# Patient Record
Sex: MALE | Race: WHITE
[De-identification: names, ages, dates, MRNs, and addresses within clinical notes are randomized per-mention and may not be internally consistent; named-entity substitution may affect disease eponyms.]

---

## 2017-07-20 ENCOUNTER — APPOINTMENT (OUTPATIENT)
Dept: INTERNAL MEDICINE | Facility: CLINIC | Age: 72
End: 2017-07-20

## 2017-07-20 ENCOUNTER — NON-APPOINTMENT (OUTPATIENT)
Age: 72
End: 2017-07-20

## 2017-07-20 VITALS
HEIGHT: 73 IN | SYSTOLIC BLOOD PRESSURE: 110 MMHG | WEIGHT: 238 LBS | HEART RATE: 66 BPM | TEMPERATURE: 98.4 F | OXYGEN SATURATION: 98 % | BODY MASS INDEX: 31.54 KG/M2 | DIASTOLIC BLOOD PRESSURE: 80 MMHG

## 2017-07-20 VITALS — DIASTOLIC BLOOD PRESSURE: 82 MMHG | SYSTOLIC BLOOD PRESSURE: 120 MMHG

## 2017-07-20 DIAGNOSIS — M54.5 LOW BACK PAIN: ICD-10-CM

## 2017-07-20 DIAGNOSIS — Z86.010 PERSONAL HISTORY OF COLONIC POLYPS: ICD-10-CM

## 2017-07-20 LAB
BILIRUB UR QL STRIP: NEGATIVE
CLARITY UR: CLEAR
COLLECTION METHOD: NORMAL
GLUCOSE UR-MCNC: NEGATIVE
HCG UR QL: 0.2 EU/DL
HGB UR QL STRIP.AUTO: NEGATIVE
KETONES UR-MCNC: NEGATIVE
LEUKOCYTE ESTERASE UR QL STRIP: NEGATIVE
NITRITE UR QL STRIP: NEGATIVE
PH UR STRIP: 5
PROT UR STRIP-MCNC: NEGATIVE
SP GR UR STRIP: 1.02

## 2017-07-20 RX ORDER — PAZOPANIB HYDROCHLORIDE 200 MG/1
200 TABLET, FILM COATED ORAL DAILY
Refills: 0 | Status: ACTIVE | COMMUNITY
Start: 2017-07-20

## 2018-07-26 ENCOUNTER — TRANSCRIPTION ENCOUNTER (OUTPATIENT)
Age: 73
End: 2018-07-26

## 2018-07-26 ENCOUNTER — APPOINTMENT (OUTPATIENT)
Dept: INTERNAL MEDICINE | Facility: CLINIC | Age: 73
End: 2018-07-26
Payer: COMMERCIAL

## 2018-07-26 ENCOUNTER — NON-APPOINTMENT (OUTPATIENT)
Age: 73
End: 2018-07-26

## 2018-07-26 VITALS
WEIGHT: 231 LBS | SYSTOLIC BLOOD PRESSURE: 110 MMHG | BODY MASS INDEX: 30.62 KG/M2 | DIASTOLIC BLOOD PRESSURE: 82 MMHG | HEART RATE: 67 BPM | HEIGHT: 73 IN | TEMPERATURE: 98.7 F | OXYGEN SATURATION: 98 %

## 2018-07-26 VITALS — SYSTOLIC BLOOD PRESSURE: 126 MMHG | DIASTOLIC BLOOD PRESSURE: 84 MMHG

## 2018-07-26 DIAGNOSIS — Z82.49 FAMILY HISTORY OF ISCHEMIC HEART DISEASE AND OTHER DISEASES OF THE CIRCULATORY SYSTEM: ICD-10-CM

## 2018-07-26 DIAGNOSIS — L25.3 UNSPECIFIED CONTACT DERMATITIS DUE TO OTHER CHEMICAL PRODUCTS: ICD-10-CM

## 2018-07-26 DIAGNOSIS — Z83.49 FAMILY HISTORY OF OTHER ENDOCRINE, NUTRITIONAL AND METABOLIC DISEASES: ICD-10-CM

## 2018-07-26 DIAGNOSIS — Z80.1 FAMILY HISTORY OF MALIGNANT NEOPLASM OF TRACHEA, BRONCHUS AND LUNG: ICD-10-CM

## 2018-07-26 DIAGNOSIS — Z80.8 FAMILY HISTORY OF MALIGNANT NEOPLASM OF OTHER ORGANS OR SYSTEMS: ICD-10-CM

## 2018-07-26 PROCEDURE — 93000 ELECTROCARDIOGRAM COMPLETE: CPT

## 2018-07-26 PROCEDURE — 82270 OCCULT BLOOD FECES: CPT

## 2018-07-26 PROCEDURE — 99397 PER PM REEVAL EST PAT 65+ YR: CPT | Mod: 25

## 2018-07-26 NOTE — ASSESSMENT
[FreeTextEntry1] : 73-year-old obese male presented for annual physical exam. The patient has a history of renal cell cancer status post nephrectomy many years ago, he was found to have lung metastases a few years ago, he is now under the care of her oncologist and is considered to be stable on medication.\par \par The patient feels well without any new complaint. He is not able to lose weight, we discussed his goal weight exercise and diet. His cholesterol panel showed an elevated triglyceride, he also has minimally elevated fasting glucose. We discussed the importance of a healthy lifestyle. The patient was advised to stay on a low carbohydrate diet as well.\par \par His blood tests also showed mild vitamin D insufficiency, he was recommended to start vitamin D 1000 international units daily.\par \par The patient is up-to-date with his health care maintenance tests. He was reminded to have routine eye exam, dental care and skin exam with the dermatologist.

## 2018-07-26 NOTE — PHYSICAL EXAM
[No Acute Distress] : no acute distress [Well Nourished] : well nourished [Well Developed] : well developed [Well-Appearing] : well-appearing [Normal Sclera/Conjunctiva] : normal sclera/conjunctiva [PERRL] : pupils equal round and reactive to light [EOMI] : extraocular movements intact [Normal Oropharynx] : the oropharynx was normal [Normal TMs] : both tympanic membranes were normal [Normal Nasal Mucosa] : the nasal mucosa was normal [No JVD] : no jugular venous distention [Supple] : supple [No Lymphadenopathy] : no lymphadenopathy [No Respiratory Distress] : no respiratory distress  [Clear to Auscultation] : lungs were clear to auscultation bilaterally [Normal Rate] : normal rate  [Regular Rhythm] : with a regular rhythm [Normal S1, S2] : normal S1 and S2 [No Carotid Bruits] : no carotid bruits [Pedal Pulses Present] : the pedal pulses are present [No Edema] : there was no peripheral edema [No Extremity Clubbing/Cyanosis] : no extremity clubbing/cyanosis [No Masses] : no palpable masses [No Nipple Discharge] : no nipple discharge [No Axillary Lymphadenopathy] : no axillary lymphadenopathy [Soft] : abdomen soft [Non Tender] : non-tender [Non-distended] : non-distended [Normal Bowel Sounds] : normal bowel sounds [Normal Sphincter Tone] : normal sphincter tone [No Mass] : no mass [Stool Occult Blood] : no occult stool [Prostate Enlargement] : the prostate was not enlarged [Prostate Tenderness] : the prostate was not tender [Normal Posterior Cervical Nodes] : no posterior cervical lymphadenopathy [Normal Anterior Cervical Nodes] : no anterior cervical lymphadenopathy [No CVA Tenderness] : no CVA  tenderness [No Spinal Tenderness] : no spinal tenderness [No Joint Swelling] : no joint swelling [Grossly Normal Strength/Tone] : grossly normal strength/tone [No Rash] : no rash [No Skin Lesions] : no skin lesions [Normal Gait] : normal gait [Coordination Grossly Intact] : coordination grossly intact [No Focal Deficits] : no focal deficits [Speech Grossly Normal] : speech grossly normal [Normal Affect] : the affect was normal [Alert and Oriented x3] : oriented to person, place, and time [Normal Mood] : the mood was normal [de-identified] : obese male in stated age,

## 2018-07-26 NOTE — COUNSELING
[Weight management counseling provided] : Weight management [Healthy eating counseling provided] : healthy eating [Activity counseling provided] : activity [Target Wt Loss Goal ___] : Target weight loss goal [unfilled] lbs [Low Fat Diet] : Low fat diet [Decrease Portions] : Decrease food portions [___ min/wk activity recommended] : [unfilled] min/wk activity recommended [None] : None [Good understanding] : Patient has a good understanding of lifestyle changes and the steps needed to achieve self management goals

## 2018-07-26 NOTE — REVIEW OF SYSTEMS
[Recent Change In Weight] : ~T recent weight change [Skin Rash] : skin rash [Easy Bruising] : easy bruising [Negative] : Heme/Lymph [Fever] : no fever [Chills] : no chills [Fatigue] : no fatigue [Chest Pain] : no chest pain [Palpitations] : no palpitations [Shortness Of Breath] : no shortness of breath [Wheezing] : no wheezing [Cough] : no cough [Dyspnea on Exertion] : not dyspnea on exertion [Abdominal Pain] : no abdominal pain [Nausea] : no nausea [Constipation] : no constipation [Diarrhea] : no diarrhea [Vomiting] : no vomiting [Heartburn] : no heartburn [Melena] : no melena [Dysuria] : no dysuria [Incontinence] : no incontinence [Nocturia] : no nocturia [Hematuria] : no hematuria [Joint Pain] : no joint pain [Joint Stiffness] : no joint stiffness [Muscle Pain] : no muscle pain [Back Pain] : no back pain [Joint Swelling] : no joint swelling [Itching] : no itching [Mole Changes] : no mole changes [Insomnia] : no insomnia [Anxiety] : no anxiety [Depression] : no depression [Easy Bleeding] : no easy bleeding [Swollen Glands] : no swollen glands [FreeTextEntry2] : Intentional weight loss of about 7-8 pounds in the past year. [FreeTextEntry3] : wears reading glasses, [de-identified] : Rash from Votrient, has topical steroid, use PRN only. [de-identified] : Easy bruisability on Votrient,

## 2018-07-26 NOTE — HISTORY OF PRESENT ILLNESS
[de-identified] : Pt presented for PE.\par \par He is still going to [facility] for recurrent renal cell CA in lung since 2016 every 8 weeks, and getting CT scan every 4 months.  Last CT scan is stable.   He is still on Votrient 200 mg 1 tab daily.\par \par The original renal cell CA was treated with L nephrectomy in 2005.\par \par In the past year, his health was uneventful, and he has no new complaint.  He is still working. He also exercises 3 x per week.\par \par Pt had Flu vaccine with the Oncologist in October.

## 2018-07-26 NOTE — DATA REVIEWED
[FreeTextEntry1] : Derm - 6/2017\par stress echo - 7/5/2011\par \par EKG - NSR, R - 66, axis - (-)15, no interval change. \par \par Labs 7/19/2018 reviewed - WBC, 4.6, H/H - 15.1/45.3, MCV - 100, PLT - 141(L), normal diff,\par                                              Glucose - 106,\par                                              Chol - 192, HDL - 44, LDL - 93, TG - 274,\par                                              Vitamin D - 27.9\par                                              the rest of labs were unremarkable.

## 2018-07-26 NOTE — HEALTH RISK ASSESSMENT
[Very Good] : ~his/her~  mood as very good [No falls in past year] : Patient reported no falls in the past year [0] : 2) Feeling down, depressed, or hopeless: Not at all (0) [HIV test declined] : HIV test declined [Hepatitis C test declined] : Hepatitis C test declined [None] : None [With Family] : lives with family [# of Members in Household ___] :  household currently consist of [unfilled] member(s) [Employed] : employed [Graduate School] : graduate school [] :  [# Of Children ___] : has [unfilled] children [Feels Safe at Home] : Feels safe at home [Fully functional (bathing, dressing, toileting, transferring, walking, feeding)] : Fully functional (bathing, dressing, toileting, transferring, walking, feeding) [Fully functional (using the telephone, shopping, preparing meals, housekeeping, doing laundry, using] : Fully functional and needs no help or supervision to perform IADLs (using the telephone, shopping, preparing meals, housekeeping, doing laundry, using transportation, managing medications and managing finances) [Smoke Detector] : smoke detector [Carbon Monoxide Detector] : carbon monoxide detector [Seat Belt] :  uses seat belt [Discussed at today's visit] : Advance Directives Discussed at today's visit [Relationship: ___] : Relationship: [unfilled] [] : No [de-identified] : Social [Change in mental status noted] : No change in mental status noted [Reports changes in hearing] : Reports no changes in hearing [Reports changes in vision] : Reports no changes in vision [Reports changes in dental health] : Reports no changes in dental health [ColonoscopyDate] : 07/16 [de-identified] : eye exam - 8/2017 [de-identified] : dentist - 5/2018

## 2018-07-31 ENCOUNTER — INBOUND DOCUMENT (OUTPATIENT)
Age: 73
End: 2018-07-31

## 2018-10-02 ENCOUNTER — INBOUND DOCUMENT (OUTPATIENT)
Age: 73
End: 2018-10-02

## 2018-11-02 ENCOUNTER — APPOINTMENT (OUTPATIENT)
Dept: INTERNAL MEDICINE | Facility: CLINIC | Age: 73
End: 2018-11-02
Payer: COMMERCIAL

## 2018-11-02 VITALS
BODY MASS INDEX: 30.88 KG/M2 | WEIGHT: 233 LBS | DIASTOLIC BLOOD PRESSURE: 86 MMHG | HEIGHT: 73 IN | TEMPERATURE: 98.4 F | SYSTOLIC BLOOD PRESSURE: 120 MMHG

## 2018-11-02 DIAGNOSIS — Z23 ENCOUNTER FOR IMMUNIZATION: ICD-10-CM

## 2018-11-02 PROCEDURE — 90662 IIV NO PRSV INCREASED AG IM: CPT

## 2018-11-02 PROCEDURE — 99212 OFFICE O/P EST SF 10 MIN: CPT | Mod: 25

## 2018-11-02 PROCEDURE — G0008: CPT

## 2018-11-02 NOTE — PHYSICAL EXAM
[No Acute Distress] : no acute distress [Well Nourished] : well nourished [Well Developed] : well developed [No Respiratory Distress] : no respiratory distress  [Clear to Auscultation] : lungs were clear to auscultation bilaterally [Normal Rate] : normal rate  [Regular Rhythm] : with a regular rhythm [Normal S1, S2] : normal S1 and S2 [No Edema] : there was no peripheral edema [No Extremity Clubbing/Cyanosis] : no extremity clubbing/cyanosis [Soft] : abdomen soft [Non Tender] : non-tender [Normal Bowel Sounds] : normal bowel sounds [No Joint Swelling] : no joint swelling [Grossly Normal Strength/Tone] : grossly normal strength/tone [de-identified] : Obese male,

## 2018-11-02 NOTE — HISTORY OF PRESENT ILLNESS
[de-identified] : Pt presented for Flu vaccine.  He feels well without any new complaint.\par \par He is continued on Votrient for kidney cancer with lung mets.  He is doing well without side effect, and he is considered stable by oncologist, who he follows up regularly,

## 2018-11-02 NOTE — ASSESSMENT
[FreeTextEntry1] : 73-year-old patient with kidney cancer and lung metastases presented for flu vaccine. \par High dose Fluzone vaccine 0.5 mL was administered IM 2 left deltoid. \par Patient is stable on Votrient and tolerates medication well.  He follows up with oncologist regularly.

## 2018-12-17 ENCOUNTER — INBOUND DOCUMENT (OUTPATIENT)
Age: 73
End: 2018-12-17

## 2019-02-12 ENCOUNTER — INBOUND DOCUMENT (OUTPATIENT)
Age: 74
End: 2019-02-12

## 2019-04-23 ENCOUNTER — INBOUND DOCUMENT (OUTPATIENT)
Age: 74
End: 2019-04-23

## 2019-05-10 ENCOUNTER — RX RENEWAL (OUTPATIENT)
Age: 74
End: 2019-05-10

## 2019-08-07 ENCOUNTER — NON-APPOINTMENT (OUTPATIENT)
Age: 74
End: 2019-08-07

## 2019-08-07 ENCOUNTER — APPOINTMENT (OUTPATIENT)
Dept: INTERNAL MEDICINE | Facility: CLINIC | Age: 74
End: 2019-08-07
Payer: COMMERCIAL

## 2019-08-07 VITALS
BODY MASS INDEX: 29.55 KG/M2 | SYSTOLIC BLOOD PRESSURE: 110 MMHG | HEIGHT: 73 IN | WEIGHT: 223 LBS | HEART RATE: 72 BPM | TEMPERATURE: 98.4 F | DIASTOLIC BLOOD PRESSURE: 70 MMHG | OXYGEN SATURATION: 92 %

## 2019-08-07 PROCEDURE — 93000 ELECTROCARDIOGRAM COMPLETE: CPT

## 2019-08-07 PROCEDURE — 82270 OCCULT BLOOD FECES: CPT

## 2019-08-07 PROCEDURE — 99397 PER PM REEVAL EST PAT 65+ YR: CPT | Mod: 25

## 2019-08-07 NOTE — DATA REVIEWED
[FreeTextEntry1] : Derm - 10/2019, \par \par EKG - NSR< R - 67, axis - (-)15, no interval change. \par \par labs 8/3/2019 reviewed -\par * glucose - 81, HbA1c - 5.4\par * Chol - 187, HDL - 46, LDL - 111, TG - 151,\par * Vitamin D - 36.7,\par * the rest of labs were unremarkable.

## 2019-08-07 NOTE — COUNSELING
[Potential consequences of obesity discussed] : Potential consequences of obesity discussed [Benefits of weight loss discussed] : Benefits of weight loss discussed [Encouraged to increase physical activity] : Encouraged to increase physical activity [Target Wt Loss Goal ___] : Weight Loss Goals: Target weight loss goal [unfilled] lbs [____ min/wk Activity] : [unfilled] min/wk activity [Decrease Portions] : decrease portions [Good understanding] : Patient has a good understanding of disease, goals and obesity follow-up plan

## 2019-08-07 NOTE — PHYSICAL EXAM
[Well Nourished] : well nourished [No Acute Distress] : no acute distress [Well Developed] : well developed [Normal Sclera/Conjunctiva] : normal sclera/conjunctiva [PERRL] : pupils equal round and reactive to light [EOMI] : extraocular movements intact [Normal Outer Ear/Nose] : the outer ears and nose were normal in appearance [Normal Oropharynx] : the oropharynx was normal [Normal TMs] : both tympanic membranes were normal [Normal Nasal Mucosa] : the nasal mucosa was normal [No JVD] : no jugular venous distention [No Lymphadenopathy] : no lymphadenopathy [Supple] : supple [No Respiratory Distress] : no respiratory distress  [Normal Rate] : normal rate  [Clear to Auscultation] : lungs were clear to auscultation bilaterally [Regular Rhythm] : with a regular rhythm [Normal S1, S2] : normal S1 and S2 [No Carotid Bruits] : no carotid bruits [Pedal Pulses Present] : the pedal pulses are present [No Edema] : there was no peripheral edema [Normal Appearance] : normal in appearance [No Extremity Clubbing/Cyanosis] : no extremity clubbing/cyanosis [No Masses] : no palpable masses [No Nipple Discharge] : no nipple discharge [No Axillary Lymphadenopathy] : no axillary lymphadenopathy [Soft] : abdomen soft [Non Tender] : non-tender [Non-distended] : non-distended [Normal Bowel Sounds] : normal bowel sounds [Normal Sphincter Tone] : normal sphincter tone [No Mass] : no mass [Prostate Enlargement] : the prostate was not enlarged [Prostate Tenderness] : the prostate was not tender [No Prostate Nodules] : no prostate nodules [Normal Supraclavicular Nodes] : no supraclavicular lymphadenopathy [Normal Axillary Nodes] : no axillary lymphadenopathy [Normal Posterior Cervical Nodes] : no posterior cervical lymphadenopathy [Normal Anterior Cervical Nodes] : no anterior cervical lymphadenopathy [No CVA Tenderness] : no CVA  tenderness [No Spinal Tenderness] : no spinal tenderness [No Joint Swelling] : no joint swelling [Grossly Normal Strength/Tone] : grossly normal strength/tone [No Rash] : no rash [Coordination Grossly Intact] : coordination grossly intact [No Focal Deficits] : no focal deficits [Normal Gait] : normal gait [Speech Grossly Normal] : speech grossly normal [Normal Affect] : the affect was normal [Alert and Oriented x3] : oriented to person, place, and time [Normal Mood] : the mood was normal [Stool Occult Blood] : stool negative for occult blood [de-identified] : overweight male in stated age,

## 2019-08-07 NOTE — ASSESSMENT
[FreeTextEntry1] : Pt is due for repeat colonoscopy, he will f/u with GI.  He was reminded to have routine eye exam, dental care and skin exam with dermatologist.

## 2019-08-07 NOTE — HEALTH RISK ASSESSMENT
[Excellent] : ~his/her~ current health as excellent [Yes] : Yes [2 - 4 times a month (2 pts)] : 2-4 times a month (2 points) [1 or 2 (0 pts)] : 1 or 2 (0 points) [No] : In the past 12 months have you used drugs other than those required for medical reasons? No [Never (0 pts)] : Never (0 points) [No falls in past year] : Patient reported no falls in the past year [0] : 2) Feeling down, depressed, or hopeless: Not at all (0) [HIV Test offered] : HIV Test offered [Hepatitis C test offered] : Hepatitis C test offered [With Family] : lives with family [None] : None [# of Members in Household ___] :  household currently consist of [unfilled] member(s) [Employed] : employed [Graduate School] : graduate school [] :  [# Of Children ___] : has [unfilled] children [Feels Safe at Home] : Feels safe at home [Fully functional (bathing, dressing, toileting, transferring, walking, feeding)] : Fully functional (bathing, dressing, toileting, transferring, walking, feeding) [Fully functional (using the telephone, shopping, preparing meals, housekeeping, doing laundry, using] : Fully functional and needs no help or supervision to perform IADLs (using the telephone, shopping, preparing meals, housekeeping, doing laundry, using transportation, managing medications and managing finances) [Carbon Monoxide Detector] : carbon monoxide detector [Smoke Detector] : smoke detector [Seat Belt] :  uses seat belt [With Patient/Caregiver] : With Patient/Caregiver [Relationship: ___] : Relationship: [unfilled] [] : No [de-identified] : goes to the gym 3 days a week, walks a lot. [EyeExamDate] : 09/18 [Change in mental status noted] : No change in mental status noted [Reports changes in hearing] : Reports no changes in hearing [Reports changes in vision] : Reports no changes in vision [Reports changes in dental health] : Reports no changes in dental health [de-identified] : dentist - 06/2019,every 3-4 months [ColonoscopyDate] : 07/16 [AdvancecareDate] : 08/2019

## 2019-08-07 NOTE — HISTORY OF PRESENT ILLNESS
[de-identified] : Pt presented for PE.  Last PE was 7/2018.  \par \par Pt still goes to [facility] every 8 weeks, and gets labs every 8 weeks.  He gets a CT scan every 4 months, and is still on Votrient.  The lung lesion is stable.

## 2019-08-07 NOTE — REVIEW OF SYSTEMS
[Recent Change In Weight] : ~T recent weight change [Diarrhea] : diarrhea [Negative] : Psychiatric [Fever] : no fever [Chills] : no chills [Chest Pain] : no chest pain [Fatigue] : no fatigue [Palpitations] : no palpitations [Lower Ext Edema] : no lower extremity edema [Shortness Of Breath] : no shortness of breath [Wheezing] : no wheezing [Cough] : no cough [Abdominal Pain] : no abdominal pain [Dyspnea on Exertion] : not dyspnea on exertion [Nausea] : no nausea [Constipation] : no constipation [Vomiting] : no vomiting [Heartburn] : no heartburn [Dysuria] : no dysuria [Melena] : no melena [Nocturia] : no nocturia [Hematuria] : no hematuria [Joint Pain] : no joint pain [Joint Stiffness] : no joint stiffness [Back Pain] : no back pain [Joint Swelling] : no joint swelling [Mole Changes] : no mole changes [Itching] : no itching [Skin Rash] : no skin rash [Dizziness] : no dizziness [Headache] : no headache [Fainting] : no fainting [Insomnia] : no insomnia [Unsteady Walk] : no ataxia [Anxiety] : no anxiety [Depression] : no depression [Easy Bleeding] : no easy bleeding [Easy Bruising] : no easy bruising [Swollen Glands] : no swollen glands [FreeTextEntry2] : 10 pounds intentional weight loss in the last 9 months, [FreeTextEntry7] : 1-2 bout loose stool every week from Votrient,

## 2019-09-26 ENCOUNTER — APPOINTMENT (OUTPATIENT)
Dept: GASTROENTEROLOGY | Facility: CLINIC | Age: 74
End: 2019-09-26
Payer: COMMERCIAL

## 2019-09-26 VITALS
TEMPERATURE: 98.6 F | BODY MASS INDEX: 30.22 KG/M2 | OXYGEN SATURATION: 97 % | SYSTOLIC BLOOD PRESSURE: 110 MMHG | WEIGHT: 228 LBS | DIASTOLIC BLOOD PRESSURE: 80 MMHG | HEART RATE: 74 BPM | HEIGHT: 73 IN

## 2019-09-26 DIAGNOSIS — Z12.11 ENCOUNTER FOR SCREENING FOR MALIGNANT NEOPLASM OF COLON: ICD-10-CM

## 2019-09-26 PROCEDURE — 99203 OFFICE O/P NEW LOW 30 MIN: CPT

## 2019-09-26 NOTE — HISTORY OF PRESENT ILLNESS
[FreeTextEntry1] : Patient came to the office today to arrange for colonoscopy. His been about 3 if he is at his last examination. Patient has a history of adenomatous polyps. Patient's past medical and surgical history are reviewed. He is under treatment at [facility] for metastatic renal cell cancer noted on chest CT. Patient overall feels well and denies current complaint of nausea vomiting fever chills rectal bleeding or melena.

## 2019-09-26 NOTE — PHYSICAL EXAM
[General Appearance - Alert] : alert [General Appearance - In No Acute Distress] : in no acute distress [PERRL With Normal Accommodation] : pupils were equal in size, round, and reactive to light [Sclera] : the sclera and conjunctiva were normal [Extraocular Movements] : extraocular movements were intact [Outer Ear] : the ears and nose were normal in appearance [Oropharynx] : the oropharynx was normal [Neck Appearance] : the appearance of the neck was normal [Neck Cervical Mass (___cm)] : no neck mass was observed [Jugular Venous Distention Increased] : there was no jugular-venous distention [Thyroid Diffuse Enlargement] : the thyroid was not enlarged [Thyroid Nodule] : there were no palpable thyroid nodules [Auscultation Breath Sounds / Voice Sounds] : lungs were clear to auscultation bilaterally [Heart Sounds] : normal S1 and S2 [Heart Rate And Rhythm] : heart rate was normal and rhythm regular [Heart Sounds Gallop] : no gallops [Murmurs] : no murmurs [Heart Sounds Pericardial Friction Rub] : no pericardial rub [Edema] : there was no peripheral edema [Bowel Sounds] : normal bowel sounds [Veins - Varicosity Changes] : there were no varicosital changes [Abdomen Soft] : soft [Abdomen Tenderness] : non-tender [FreeTextEntry1] : Surgical scar left [Abdomen Mass (___ Cm)] : no abdominal mass palpated [Cervical Lymph Nodes Enlarged Posterior Bilaterally] : posterior cervical [Cervical Lymph Nodes Enlarged Anterior Bilaterally] : anterior cervical [Supraclavicular Lymph Nodes Enlarged Bilaterally] : supraclavicular [No CVA Tenderness] : no ~M costovertebral angle tenderness [No Spinal Tenderness] : no spinal tenderness [Abnormal Walk] : normal gait [Nail Clubbing] : no clubbing  or cyanosis of the fingernails [Musculoskeletal - Swelling] : no joint swelling seen [Motor Tone] : muscle strength and tone were normal [Skin Color & Pigmentation] : normal skin color and pigmentation [Skin Turgor] : normal skin turgor [] : no rash [Sensation] : the sensory exam was normal to light touch and pinprick [Oriented To Time, Place, And Person] : oriented to person, place, and time [Affect] : the affect was normal [Impaired Insight] : insight and judgment were intact

## 2019-09-26 NOTE — ASSESSMENT
[FreeTextEntry1] : Impression and plan\par \par Patient presents to the office today to arrange for colonoscopy. The risks benefits alternatives and limitations were discussed and make further suggestions after above testing is completed.

## 2019-10-10 ENCOUNTER — APPOINTMENT (OUTPATIENT)
Dept: GASTROENTEROLOGY | Facility: AMBULATORY MEDICAL SERVICES | Age: 74
End: 2019-10-10
Payer: COMMERCIAL

## 2019-10-10 PROCEDURE — 45378 DIAGNOSTIC COLONOSCOPY: CPT

## 2020-08-04 LAB
APPEARANCE: CLEAR
BASOPHILS # BLD AUTO: 0.02 K/UL
BASOPHILS NFR BLD AUTO: 0.4 %
BILIRUBIN URINE: NEGATIVE
BLOOD URINE: NEGATIVE
COLOR: YELLOW
EOSINOPHIL # BLD AUTO: 0.05 K/UL
EOSINOPHIL NFR BLD AUTO: 1 %
GLUCOSE QUALITATIVE U: NEGATIVE
HCT VFR BLD CALC: 48.7 %
HGB BLD-MCNC: 15.8 G/DL
IMM GRANULOCYTES NFR BLD AUTO: 0.4 %
KETONES URINE: NEGATIVE
LEUKOCYTE ESTERASE URINE: NEGATIVE
LYMPHOCYTES # BLD AUTO: 1.27 K/UL
LYMPHOCYTES NFR BLD AUTO: 24.8 %
MAN DIFF?: NORMAL
MCHC RBC-ENTMCNC: 32.4 GM/DL
MCHC RBC-ENTMCNC: 32.8 PG
MCV RBC AUTO: 101.2 FL
MONOCYTES # BLD AUTO: 0.57 K/UL
MONOCYTES NFR BLD AUTO: 11.1 %
NEUTROPHILS # BLD AUTO: 3.19 K/UL
NEUTROPHILS NFR BLD AUTO: 62.3 %
NITRITE URINE: NEGATIVE
PH URINE: 6
PLATELET # BLD AUTO: 209 K/UL
PROTEIN URINE: NORMAL
PSA SERPL-MCNC: 4.4 NG/ML
RBC # BLD: 4.81 M/UL
RBC # FLD: 13.8 %
SPECIFIC GRAVITY URINE: 1.02
UROBILINOGEN URINE: NORMAL
WBC # FLD AUTO: 5.12 K/UL

## 2020-08-05 LAB
25(OH)D3 SERPL-MCNC: 50.3 NG/ML
ALBUMIN SERPL ELPH-MCNC: 4.5 G/DL
ALP BLD-CCNC: 68 U/L
ALT SERPL-CCNC: 20 U/L
ANION GAP SERPL CALC-SCNC: 19 MMOL/L
AST SERPL-CCNC: 31 U/L
BILIRUB SERPL-MCNC: 0.5 MG/DL
BUN SERPL-MCNC: 21 MG/DL
CALCIUM SERPL-MCNC: 9.4 MG/DL
CHLORIDE SERPL-SCNC: 101 MMOL/L
CHOLEST SERPL-MCNC: 221 MG/DL
CHOLEST/HDLC SERPL: 4.8 RATIO
CO2 SERPL-SCNC: 21 MMOL/L
CREAT SERPL-MCNC: 1.39 MG/DL
FOLATE SERPL-MCNC: 14.5 NG/ML
GLUCOSE SERPL-MCNC: 93 MG/DL
HDLC SERPL-MCNC: 46 MG/DL
LDLC SERPL CALC-MCNC: 128 MG/DL
POTASSIUM SERPL-SCNC: 5.2 MMOL/L
PROT SERPL-MCNC: 7 G/DL
SODIUM SERPL-SCNC: 141 MMOL/L
T4 FREE SERPL-MCNC: 1.6 NG/DL
TRIGL SERPL-MCNC: 235 MG/DL
TSH SERPL-ACNC: 4.81 UIU/ML
VIT B12 SERPL-MCNC: 536 PG/ML

## 2020-08-10 ENCOUNTER — NON-APPOINTMENT (OUTPATIENT)
Age: 75
End: 2020-08-10

## 2020-08-10 ENCOUNTER — APPOINTMENT (OUTPATIENT)
Dept: INTERNAL MEDICINE | Facility: CLINIC | Age: 75
End: 2020-08-10
Payer: COMMERCIAL

## 2020-08-10 VITALS
WEIGHT: 222 LBS | DIASTOLIC BLOOD PRESSURE: 89 MMHG | SYSTOLIC BLOOD PRESSURE: 130 MMHG | HEART RATE: 67 BPM | HEIGHT: 73 IN | OXYGEN SATURATION: 98 % | TEMPERATURE: 98.7 F | BODY MASS INDEX: 29.42 KG/M2

## 2020-08-10 VITALS
DIASTOLIC BLOOD PRESSURE: 78 MMHG | WEIGHT: 225 LBS | HEIGHT: 73 IN | SYSTOLIC BLOOD PRESSURE: 132 MMHG | BODY MASS INDEX: 29.82 KG/M2

## 2020-08-10 DIAGNOSIS — D75.89 OTHER SPECIFIED DISEASES OF BLOOD AND BLOOD-FORMING ORGANS: ICD-10-CM

## 2020-08-10 DIAGNOSIS — Z11.59 ENCOUNTER FOR SCREENING FOR OTHER VIRAL DISEASES: ICD-10-CM

## 2020-08-10 PROCEDURE — 82270 OCCULT BLOOD FECES: CPT

## 2020-08-10 PROCEDURE — 93000 ELECTROCARDIOGRAM COMPLETE: CPT

## 2020-08-10 PROCEDURE — 99397 PER PM REEVAL EST PAT 65+ YR: CPT | Mod: 25

## 2020-08-10 RX ORDER — MULTIVIT-MIN/FOLIC/VIT K/LYCOP 400-300MCG
250 TABLET ORAL DAILY
Refills: 0 | Status: COMPLETED | COMMUNITY
Start: 2019-08-07 | End: 2020-08-10

## 2020-08-10 RX ORDER — SODIUM SULFATE, POTASSIUM SULFATE, MAGNESIUM SULFATE 17.5; 3.13; 1.6 G/ML; G/ML; G/ML
17.5-3.13-1.6 SOLUTION, CONCENTRATE ORAL
Qty: 1 | Refills: 0 | Status: COMPLETED | COMMUNITY
Start: 2019-09-26 | End: 2020-08-10

## 2020-08-10 RX ORDER — MUPIROCIN 20 MG/G
2 OINTMENT TOPICAL
Qty: 22 | Refills: 0 | Status: COMPLETED | COMMUNITY
Start: 2018-10-29 | End: 2020-08-10

## 2020-08-10 RX ORDER — ZOSTER VACCINE RECOMBINANT, ADJUVANTED 50 MCG/0.5
50 KIT INTRAMUSCULAR
Qty: 1 | Refills: 1 | Status: COMPLETED | COMMUNITY
Start: 2019-05-10 | End: 2020-08-10

## 2020-08-10 NOTE — HEALTH RISK ASSESSMENT
[Very Good] : ~his/her~ current health as very good [Excellent] : ~his/her~  mood as  excellent [Yes] : Yes [2 - 3 times a week (3 pts)] : 2 - 3  times a week (3 points) [1 or 2 (0 pts)] : 1 or 2 (0 points) [Never (0 pts)] : Never (0 points) [No] : In the past 12 months have you used drugs other than those required for medical reasons? No [No falls in past year] : Patient reported no falls in the past year [0] : 2) Feeling down, depressed, or hopeless: Not at all (0) [None] : None [With Family] : lives with family [# of Members in Household ___] :  household currently consist of [unfilled] member(s) [Employed] : employed [Graduate School] : graduate school [] :  [# Of Children ___] : has [unfilled] children [Feels Safe at Home] : Feels safe at home [Fully functional (bathing, dressing, toileting, transferring, walking, feeding)] : Fully functional (bathing, dressing, toileting, transferring, walking, feeding) [Fully functional (using the telephone, shopping, preparing meals, housekeeping, doing laundry, using] : Fully functional and needs no help or supervision to perform IADLs (using the telephone, shopping, preparing meals, housekeeping, doing laundry, using transportation, managing medications and managing finances) [Smoke Detector] : smoke detector [Carbon Monoxide Detector] : carbon monoxide detector [Seat Belt] :  uses seat belt [With Patient/Caregiver] : With Patient/Caregiver [Relationship: ___] : Relationship: [unfilled] [] : No [Audit-CScore] : 3 [de-identified] : walks a lot, 45 minutes about 5 days per week, [[id number]] : 0 [EyeExamDate] : 08/19 [Reports changes in vision] : Reports no changes in vision [Change in mental status noted] : No change in mental status noted [Reports changes in hearing] : Reports no changes in hearing [Reports changes in dental health] : Reports no changes in dental health [ColonoscopyComments] : EGD - 11/2015 [ColonoscopyDate] : 10/10/19 [de-identified] : 07/20 [AdvancecareDate] : 08/20

## 2020-08-10 NOTE — ASSESSMENT
[FreeTextEntry1] : Pt is UTD with colonoscopy, he was reminded to have routine eye exam, dental care and skin exam with dermatologist.

## 2020-08-10 NOTE — HISTORY OF PRESENT ILLNESS
[de-identified] : Pt presented for PE.  Last PE was 1 year ago.  His health was uneventful since last visit, he has no new complaint. \par \par Pt was well and did not get sick throughout the COVID pandemic. He would like to have COVID antibody test.\par \par He still sees oncologist every 8 weeks, and alternates with blood test and CT scan every 8 weeks and was stable, he's still taking Votrient.\par \par Pt saw dermatologist, and probably has a basal cell CA on scalp, the MOHS surgery is planned in 9/2020.

## 2020-08-10 NOTE — REVIEW OF SYSTEMS
[Diarrhea] : diarrhea [Negative] : Psychiatric [Fever] : no fever [Chills] : no chills [Recent Change In Weight] : ~T no recent weight change [Chest Pain] : no chest pain [Fatigue] : no fatigue [Lower Ext Edema] : no lower extremity edema [Palpitations] : no palpitations [Wheezing] : no wheezing [Shortness Of Breath] : no shortness of breath [Cough] : no cough [Dyspnea on Exertion] : not dyspnea on exertion [Abdominal Pain] : no abdominal pain [Constipation] : no constipation [Nausea] : no nausea [Vomiting] : no vomiting [Dysuria] : no dysuria [Heartburn] : no heartburn [Melena] : no melena [Nocturia] : no nocturia [Incontinence] : no incontinence [Hematuria] : no hematuria [Joint Pain] : no joint pain [Joint Stiffness] : no joint stiffness [Itching] : no itching [Back Pain] : no back pain [Joint Swelling] : no joint swelling [Mole Changes] : no mole changes [Skin Rash] : no skin rash [Headache] : no headache [Fainting] : no fainting [Dizziness] : no dizziness [Insomnia] : no insomnia [Unsteady Walk] : no ataxia [Anxiety] : no anxiety [Depression] : no depression [Easy Bleeding] : no easy bleeding [Easy Bruising] : no easy bruising [Swollen Glands] : no swollen glands [FreeTextEntry3] : wears corrective lens, [FreeTextEntry7] : 1-2 bout loose stool every week from Votrient, no change [de-identified] : as in HPI,

## 2020-08-10 NOTE — PHYSICAL EXAM
[Well Nourished] : well nourished [No Acute Distress] : no acute distress [Well Developed] : well developed [Normal Sclera/Conjunctiva] : normal sclera/conjunctiva [PERRL] : pupils equal round and reactive to light [Normal Outer Ear/Nose] : the outer ears and nose were normal in appearance [EOMI] : extraocular movements intact [Normal Oropharynx] : the oropharynx was normal [Normal Nasal Mucosa] : the nasal mucosa was normal [No JVD] : no jugular venous distention [No Lymphadenopathy] : no lymphadenopathy [Supple] : supple [No Respiratory Distress] : no respiratory distress  [Clear to Auscultation] : lungs were clear to auscultation bilaterally [Regular Rhythm] : with a regular rhythm [Normal S1, S2] : normal S1 and S2 [Normal Rate] : normal rate  [No Carotid Bruits] : no carotid bruits [Pedal Pulses Present] : the pedal pulses are present [No Edema] : there was no peripheral edema [No Extremity Clubbing/Cyanosis] : no extremity clubbing/cyanosis [No Masses] : no palpable masses [Normal Appearance] : normal in appearance [Soft] : abdomen soft [No Nipple Discharge] : no nipple discharge [No Axillary Lymphadenopathy] : no axillary lymphadenopathy [Non Tender] : non-tender [Non-distended] : non-distended [Normal Bowel Sounds] : normal bowel sounds [Normal Sphincter Tone] : normal sphincter tone [No Mass] : no mass [Prostate Enlarged] : was enlarged [Normal Supraclavicular Nodes] : no supraclavicular lymphadenopathy [Normal Posterior Cervical Nodes] : no posterior cervical lymphadenopathy [Normal Axillary Nodes] : no axillary lymphadenopathy [Normal Anterior Cervical Nodes] : no anterior cervical lymphadenopathy [No Spinal Tenderness] : no spinal tenderness [No CVA Tenderness] : no CVA  tenderness [No Joint Swelling] : no joint swelling [Grossly Normal Strength/Tone] : grossly normal strength/tone [No Rash] : no rash [Coordination Grossly Intact] : coordination grossly intact [No Focal Deficits] : no focal deficits [Normal Gait] : normal gait [Alert and Oriented x3] : oriented to person, place, and time [Normal Affect] : the affect was normal [Speech Grossly Normal] : speech grossly normal [Normal Mood] : the mood was normal [Stool Occult Blood] : stool negative for occult blood [Prostate Nodule] : did not have a nodule [Prostate Tenderness] : was not tender [de-identified] : male in stated age,

## 2020-12-21 PROBLEM — Z12.11 ENCOUNTER FOR SCREENING COLONOSCOPY: Status: RESOLVED | Noted: 2019-09-26 | Resolved: 2020-12-21

## 2021-09-07 DIAGNOSIS — Z00.00 ENCOUNTER FOR GENERAL ADULT MEDICAL EXAMINATION W/OUT ABNORMAL FINDINGS: ICD-10-CM

## 2021-09-13 ENCOUNTER — LABORATORY RESULT (OUTPATIENT)
Age: 76
End: 2021-09-13

## 2021-09-13 LAB
25(OH)D3 SERPL-MCNC: 69.2 NG/ML
ALBUMIN SERPL ELPH-MCNC: 4.4 G/DL
ALP BLD-CCNC: 68 U/L
ALT SERPL-CCNC: 19 U/L
ANION GAP SERPL CALC-SCNC: 17 MMOL/L
APPEARANCE: CLEAR
AST SERPL-CCNC: 29 U/L
BASOPHILS # BLD AUTO: 0.01 K/UL
BASOPHILS NFR BLD AUTO: 0.2 %
BILIRUB SERPL-MCNC: 0.8 MG/DL
BILIRUBIN URINE: NEGATIVE
BLOOD URINE: NEGATIVE
BUN SERPL-MCNC: 22 MG/DL
CALCIUM SERPL-MCNC: 9.4 MG/DL
CHLORIDE SERPL-SCNC: 98 MMOL/L
CHOLEST SERPL-MCNC: 195 MG/DL
CO2 SERPL-SCNC: 24 MMOL/L
COLOR: YELLOW
CREAT SERPL-MCNC: 1.29 MG/DL
EOSINOPHIL # BLD AUTO: 0.04 K/UL
EOSINOPHIL NFR BLD AUTO: 0.8 %
ESTIMATED AVERAGE GLUCOSE: 108 MG/DL
GLUCOSE QUALITATIVE U: NEGATIVE
GLUCOSE SERPL-MCNC: 86 MG/DL
HBA1C MFR BLD HPLC: 5.4 %
HCT VFR BLD CALC: 48 %
HDLC SERPL-MCNC: 48 MG/DL
HGB BLD-MCNC: 16.1 G/DL
IMM GRANULOCYTES NFR BLD AUTO: 0.2 %
KETONES URINE: NEGATIVE
LDLC SERPL CALC-MCNC: 111 MG/DL
LEUKOCYTE ESTERASE URINE: NEGATIVE
LYMPHOCYTES # BLD AUTO: 1.14 K/UL
LYMPHOCYTES NFR BLD AUTO: 22.7 %
MAN DIFF?: NORMAL
MCHC RBC-ENTMCNC: 33.5 GM/DL
MCHC RBC-ENTMCNC: 34 PG
MCV RBC AUTO: 101.3 FL
MONOCYTES # BLD AUTO: 0.51 K/UL
MONOCYTES NFR BLD AUTO: 10.1 %
NEUTROPHILS # BLD AUTO: 3.32 K/UL
NEUTROPHILS NFR BLD AUTO: 66 %
NITRITE URINE: NEGATIVE
NONHDLC SERPL-MCNC: 146 MG/DL
PH URINE: 5.5
PLATELET # BLD AUTO: 171 K/UL
POTASSIUM SERPL-SCNC: 4.6 MMOL/L
PROT SERPL-MCNC: 6.9 G/DL
PROTEIN URINE: NORMAL
PSA SERPL-MCNC: 3.61 NG/ML
RBC # BLD: 4.74 M/UL
RBC # FLD: 14.6 %
SODIUM SERPL-SCNC: 138 MMOL/L
SPECIFIC GRAVITY URINE: 1.03
T3RU NFR SERPL: 0.5 TBI
T4 FREE SERPL-MCNC: 1.5 NG/DL
TRIGL SERPL-MCNC: 174 MG/DL
TSH SERPL-ACNC: 3.79 UIU/ML
UROBILINOGEN URINE: NORMAL
WBC # FLD AUTO: 5.03 K/UL

## 2021-09-14 ENCOUNTER — NON-APPOINTMENT (OUTPATIENT)
Age: 76
End: 2021-09-14

## 2021-09-14 ENCOUNTER — TRANSCRIPTION ENCOUNTER (OUTPATIENT)
Age: 76
End: 2021-09-14

## 2021-09-15 ENCOUNTER — APPOINTMENT (OUTPATIENT)
Dept: INTERNAL MEDICINE | Facility: CLINIC | Age: 76
End: 2021-09-15
Payer: MEDICARE

## 2021-09-15 ENCOUNTER — NON-APPOINTMENT (OUTPATIENT)
Age: 76
End: 2021-09-15

## 2021-09-15 VITALS
DIASTOLIC BLOOD PRESSURE: 85 MMHG | WEIGHT: 223 LBS | BODY MASS INDEX: 29.55 KG/M2 | HEIGHT: 73 IN | TEMPERATURE: 98.1 F | SYSTOLIC BLOOD PRESSURE: 134 MMHG | OXYGEN SATURATION: 98 % | HEART RATE: 71 BPM

## 2021-09-15 DIAGNOSIS — Z00.00 ENCOUNTER FOR GENERAL ADULT MEDICAL EXAMINATION W/OUT ABNORMAL FINDINGS: ICD-10-CM

## 2021-09-15 DIAGNOSIS — R97.20 ELEVATED PROSTATE, SPECIFIC ANTIGEN [PSA]: ICD-10-CM

## 2021-09-15 DIAGNOSIS — Z78.9 OTHER SPECIFIED HEALTH STATUS: ICD-10-CM

## 2021-09-15 DIAGNOSIS — E78.5 HYPERLIPIDEMIA, UNSPECIFIED: ICD-10-CM

## 2021-09-15 DIAGNOSIS — E66.3 OVERWEIGHT: ICD-10-CM

## 2021-09-15 DIAGNOSIS — R73.01 IMPAIRED FASTING GLUCOSE: ICD-10-CM

## 2021-09-15 DIAGNOSIS — Z85.828 PERSONAL HISTORY OF OTHER MALIGNANT NEOPLASM OF SKIN: ICD-10-CM

## 2021-09-15 DIAGNOSIS — R79.89 OTHER SPECIFIED ABNORMAL FINDINGS OF BLOOD CHEMISTRY: ICD-10-CM

## 2021-09-15 DIAGNOSIS — E55.9 VITAMIN D DEFICIENCY, UNSPECIFIED: ICD-10-CM

## 2021-09-15 PROCEDURE — 82270 OCCULT BLOOD FECES: CPT

## 2021-09-15 PROCEDURE — G0439: CPT

## 2021-09-15 PROCEDURE — G0444 DEPRESSION SCREEN ANNUAL: CPT | Mod: 59

## 2021-09-15 PROCEDURE — 93000 ELECTROCARDIOGRAM COMPLETE: CPT | Mod: 59

## 2021-09-15 RX ORDER — TRIAMCINOLONE ACETONIDE 1 MG/G
0.1 CREAM TOPICAL
Qty: 1 | Refills: 1 | Status: ACTIVE | COMMUNITY
Start: 2018-03-09

## 2021-09-15 RX ORDER — FLUOROURACIL 50 MG/G
5 CREAM TOPICAL
Qty: 40 | Refills: 0 | Status: COMPLETED | COMMUNITY
Start: 2018-10-08 | End: 2021-09-15

## 2021-09-15 RX ORDER — ADHESIVE TAPE 3"X 2.3 YD
50 MCG TAPE, NON-MEDICATED TOPICAL
Refills: 0 | Status: ACTIVE | COMMUNITY
Start: 2018-11-02

## 2021-09-16 PROBLEM — R97.20 ELEVATED PSA: Status: ACTIVE | Noted: 2020-08-10

## 2021-09-16 PROBLEM — E55.9 VITAMIN D INSUFFICIENCY: Status: ACTIVE | Noted: 2018-07-26

## 2021-09-16 PROBLEM — R73.01 IMPAIRED FASTING GLUCOSE: Status: ACTIVE | Noted: 2019-07-09

## 2021-09-16 PROBLEM — R79.89 ABNORMAL THYROID BLOOD TEST: Status: ACTIVE | Noted: 2020-08-10

## 2021-09-16 PROBLEM — E66.3 OVERWEIGHT (BMI 25.0-29.9): Status: ACTIVE | Noted: 2017-07-20

## 2021-09-16 NOTE — PHYSICAL EXAM
[No Acute Distress] : no acute distress [Well Nourished] : well nourished [Well Developed] : well developed [Normal Sclera/Conjunctiva] : normal sclera/conjunctiva [PERRL] : pupils equal round and reactive to light [EOMI] : extraocular movements intact [Normal Outer Ear/Nose] : the outer ears and nose were normal in appearance [Normal Oropharynx] : the oropharynx was normal [Normal Nasal Mucosa] : the nasal mucosa was normal [No JVD] : no jugular venous distention [No Lymphadenopathy] : no lymphadenopathy [Supple] : supple [No Respiratory Distress] : no respiratory distress  [Clear to Auscultation] : lungs were clear to auscultation bilaterally [Normal Rate] : normal rate  [Regular Rhythm] : with a regular rhythm [Normal S1, S2] : normal S1 and S2 [No Carotid Bruits] : no carotid bruits [Pedal Pulses Present] : the pedal pulses are present [No Edema] : there was no peripheral edema [No Extremity Clubbing/Cyanosis] : no extremity clubbing/cyanosis [Normal Appearance] : normal in appearance [No Masses] : no palpable masses [No Nipple Discharge] : no nipple discharge [No Axillary Lymphadenopathy] : no axillary lymphadenopathy [Soft] : abdomen soft [Non Tender] : non-tender [Non-distended] : non-distended [Normal Bowel Sounds] : normal bowel sounds [Normal Sphincter Tone] : normal sphincter tone [No Mass] : no mass [Prostate Enlarged] : was enlarged [Normal Supraclavicular Nodes] : no supraclavicular lymphadenopathy [Normal Axillary Nodes] : no axillary lymphadenopathy [Normal Posterior Cervical Nodes] : no posterior cervical lymphadenopathy [Normal Anterior Cervical Nodes] : no anterior cervical lymphadenopathy [No CVA Tenderness] : no CVA  tenderness [No Spinal Tenderness] : no spinal tenderness [No Joint Swelling] : no joint swelling [Grossly Normal Strength/Tone] : grossly normal strength/tone [Coordination Grossly Intact] : coordination grossly intact [No Focal Deficits] : no focal deficits [Normal Gait] : normal gait [Speech Grossly Normal] : speech grossly normal [Normal Affect] : the affect was normal [Alert and Oriented x3] : oriented to person, place, and time [Normal Mood] : the mood was normal [Normal TMs] : both tympanic membranes were normal [Stool Occult Blood] : stool negative for occult blood [Prostate Nodule] : did not have a nodule [Prostate Tenderness] : was not tender [de-identified] : male in stated age,  [de-identified] : erythematous rash on back of hands,

## 2021-09-16 NOTE — REVIEW OF SYSTEMS
[Negative] : Psychiatric [Vision Problems] : vision problems [Fever] : no fever [Chills] : no chills [Fatigue] : no fatigue [Recent Change In Weight] : ~T no recent weight change [Chest Pain] : no chest pain [Palpitations] : no palpitations [Lower Ext Edema] : no lower extremity edema [Shortness Of Breath] : no shortness of breath [Wheezing] : no wheezing [Cough] : no cough [Dyspnea on Exertion] : not dyspnea on exertion [Abdominal Pain] : no abdominal pain [Nausea] : no nausea [Constipation] : no constipation [Diarrhea] : no diarrhea [Vomiting] : no vomiting [Heartburn] : no heartburn [Melena] : no melena [Dysuria] : no dysuria [Incontinence] : no incontinence [Nocturia] : no nocturia [Hematuria] : no hematuria [Joint Pain] : no joint pain [Joint Stiffness] : no joint stiffness [Muscle Pain] : no muscle pain [Back Pain] : no back pain [Joint Swelling] : no joint swelling [Itching] : no itching [Mole Changes] : no mole changes [Skin Rash] : no skin rash [Headache] : no headache [Dizziness] : no dizziness [Fainting] : no fainting [Unsteady Walk] : no ataxia [Insomnia] : no insomnia [Anxiety] : no anxiety [Depression] : no depression [Easy Bleeding] : no easy bleeding [Easy Bruising] : no easy bruising [Swollen Glands] : no swollen glands [FreeTextEntry3] : wears corrective lens, going for cataract surgery for R eye in 2 weeks,

## 2021-09-16 NOTE — HISTORY OF PRESENT ILLNESS
[FreeTextEntry1] : Pt presented for PE.  Last PE was 8/2020. [de-identified] : His health was uneventful since last visit, he has no new complaint. \par \par Pt had COVID infection in 12/2021, and was managed at home.  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  His Votrient dose increased to 1 1/2 tab per day, and then the lung mets shrink, and pt then went back to 1 tab daily.\par \par Pt also has cataract and is going for surgery of R eye, and also needs preop clearance.  He has cataract of both eyes, but the vision in R eye has worsened, and is planning on R eye surgery first.\par \par

## 2021-09-16 NOTE — HEALTH RISK ASSESSMENT
[Yes] : Yes [2 - 4 times a month (2 pts)] : 2-4 times a month (2 points) [1 or 2 (0 pts)] : 1 or 2 (0 points) [Never (0 pts)] : Never (0 points) [No] : In the past 12 months have you used drugs other than those required for medical reasons? No [None] : None [With Family] : lives with family [# of Members in Household ___] :  household currently consist of [unfilled] member(s) [Employed] : employed [Graduate School] : graduate school [] :  [# Of Children ___] : has [unfilled] children [Feels Safe at Home] : Feels safe at home [Fully functional (bathing, dressing, toileting, transferring, walking, feeding)] : Fully functional (bathing, dressing, toileting, transferring, walking, feeding) [Fully functional (using the telephone, shopping, preparing meals, housekeeping, doing laundry, using] : Fully functional and needs no help or supervision to perform IADLs (using the telephone, shopping, preparing meals, housekeeping, doing laundry, using transportation, managing medications and managing finances) [Smoke Detector] : smoke detector [Carbon Monoxide Detector] : carbon monoxide detector [Seat Belt] :  uses seat belt [With Patient/Caregiver] : , with patient/caregiver [Relationship: ___] : Relationship: [unfilled] [Very Good] : ~his/her~ current health as very good [Excellent] : ~his/her~  mood as  excellent [One fall no injury in past year] : Patient reported one fall in the past year without injury [0] : 2) Feeling down, depressed, or hopeless: Not at all (0) [PHQ-2 Negative - No further assessment needed] : PHQ-2 Negative - No further assessment needed [] : No [Audit-CScore] : 2 [de-identified] : goes to the gym 3 days per week, also does some walking for about 45 minutes for 4 days per week, otherwise, active, [[id number]] : 0 [EyeExamDate] : 08/21 [Change in mental status noted] : No change in mental status noted [Reports changes in hearing] : Reports no changes in hearing [Reports changes in vision] : Reports no changes in vision [Reports changes in dental health] : Reports no changes in dental health [ColonoscopyDate] : 10/19 [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

## 2021-09-16 NOTE — DATA REVIEWED
[FreeTextEntry1] : Derm - 07/20\par CT of T/A/P - 8/2021\par \par EKG - NSR, R - 75, axis - (-)15, no interval change.

## 2022-01-06 ENCOUNTER — APPOINTMENT (OUTPATIENT)
Dept: INTERNAL MEDICINE | Facility: CLINIC | Age: 77
End: 2022-01-06
Payer: MEDICARE

## 2022-01-06 VITALS
HEIGHT: 73 IN | SYSTOLIC BLOOD PRESSURE: 115 MMHG | WEIGHT: 222 LBS | RESPIRATION RATE: 16 BRPM | OXYGEN SATURATION: 98 % | BODY MASS INDEX: 29.42 KG/M2 | DIASTOLIC BLOOD PRESSURE: 79 MMHG | HEART RATE: 77 BPM | TEMPERATURE: 98.2 F

## 2022-01-06 VITALS — DIASTOLIC BLOOD PRESSURE: 78 MMHG | SYSTOLIC BLOOD PRESSURE: 122 MMHG

## 2022-01-06 DIAGNOSIS — C78.00 SECONDARY MALIGNANT NEOPLASM OF UNSPECIFIED LUNG: ICD-10-CM

## 2022-01-06 DIAGNOSIS — Z01.818 ENCOUNTER FOR OTHER PREPROCEDURAL EXAMINATION: ICD-10-CM

## 2022-01-06 DIAGNOSIS — C64.2 MALIGNANT NEOPLASM OF LEFT KIDNEY, EXCEPT RENAL PELVIS: ICD-10-CM

## 2022-01-06 DIAGNOSIS — H26.9 UNSPECIFIED CATARACT: ICD-10-CM

## 2022-01-06 DIAGNOSIS — U07.1 COVID-19: ICD-10-CM

## 2022-01-06 PROCEDURE — 99215 OFFICE O/P EST HI 40 MIN: CPT

## 2022-01-06 NOTE — REVIEW OF SYSTEMS
[Vision Problems] : vision problems [Back Pain] : back pain [Easy Bruising] : easy bruising [Negative] : Heme/Lymph [Fever] : no fever [Chills] : no chills [Fatigue] : no fatigue [Recent Change In Weight] : ~T no recent weight change [Chest Pain] : no chest pain [Palpitations] : no palpitations [Lower Ext Edema] : no lower extremity edema [Shortness Of Breath] : no shortness of breath [Wheezing] : no wheezing [Cough] : no cough [Dyspnea on Exertion] : not dyspnea on exertion [Abdominal Pain] : no abdominal pain [Nausea] : no nausea [Constipation] : no constipation [Diarrhea] : no diarrhea [Vomiting] : no vomiting [Heartburn] : no heartburn [Melena] : no melena [Dysuria] : no dysuria [Incontinence] : no incontinence [Nocturia] : no nocturia [Hematuria] : no hematuria [Joint Pain] : no joint pain [Joint Stiffness] : no joint stiffness [Muscle Pain] : no muscle pain [Joint Swelling] : no joint swelling [Itching] : no itching [Mole Changes] : no mole changes [Skin Rash] : no skin rash [Headache] : no headache [Dizziness] : no dizziness [Fainting] : no fainting [Unsteady Walk] : no ataxia [Insomnia] : no insomnia [Anxiety] : no anxiety [Depression] : no depression [Easy Bleeding] : no easy bleeding [Swollen Glands] : no swollen glands [FreeTextEntry3] : As per HPI, [FreeTextEntry9] : Occ LBP, not active at present, [de-identified] : On Votrient,

## 2022-01-06 NOTE — ASSESSMENT
[Patient Optimized for Surgery] : Patient optimized for surgery [No Further Testing Recommended] : no further testing recommended [Modify medications prior to procedure] : Modify medications prior to procedure [As per surgery] : as per surgery [FreeTextEntry7] : Votrient to be held 3 days prior to surgery, pt will not need to take any meds on the morning of surgery,

## 2022-01-06 NOTE — HISTORY OF PRESENT ILLNESS
[No Pertinent Cardiac History] : no history of aortic stenosis, atrial fibrillation, coronary artery disease, recent myocardial infarction, or implantable device/pacemaker [No Pertinent Pulmonary History] : no history of asthma, COPD, sleep apnea, or smoking [No Adverse Anesthesia Reaction] : no adverse anesthesia reaction in self or family member [(Patient denies any chest pain, claudication, dyspnea on exertion, orthopnea, palpitations or syncope)] : Patient denies any chest pain, claudication, dyspnea on exertion, orthopnea, palpitations or syncope [Good (7-10 METs)] : Good (7-10 METs) [Chronic Anticoagulation] : no chronic anticoagulation [Chronic Kidney Disease] : no chronic kidney disease [Diabetes] : no diabetes [FreeTextEntry1] : L eye cataract extraction with IOL  [FreeTextEntry2] : Wednesday, 1/12/2022 [FreeTextEntry3] : Dr. Jeff Amezcua [FreeTextEntry4] : Pt has cataracts for about 2 years, and the vision worsens over the last 6-9 months.  Pt had R eye surgery in 9/2021, and did well postop.  He is now planning to have L eye surgery next week.\par

## 2022-01-06 NOTE — CONSULT LETTER
[Dear  ___] : Dear  [unfilled], [Consult Letter:] : I had the pleasure of evaluating your patient, [unfilled]. [Please see my note below.] : Please see my note below. [Consult Closing:] : Thank you very much for allowing me to participate in the care of this patient.  If you have any questions, please do not hesitate to contact me. [Sincerely,] : Sincerely, [FreeTextEntry1] : I saw the patient today for preoperative evaluation.  [FreeTextEntry3] : Rupal Méndez MD

## 2022-01-06 NOTE — RESULTS/DATA
[ECG Reviewed] : reviewed [NSR] : normal sinus rhythm [Ventricular Rate___] : ventricular rate is [unfilled] beats per minute [ECG Intervals WA.] : WA interval is normal [Normal QRS] : the QRS is normal [ECG Axis] : the QRS axis is normal [No Interval Change] : no interval change

## 2022-01-06 NOTE — PHYSICAL EXAM
[No Acute Distress] : no acute distress [Well Nourished] : well nourished [Well Developed] : well developed [Normal Sclera/Conjunctiva] : normal sclera/conjunctiva [PERRL] : pupils equal round and reactive to light [EOMI] : extraocular movements intact [Normal Outer Ear/Nose] : the outer ears and nose were normal in appearance [Normal Oropharynx] : the oropharynx was normal [Normal TMs] : both tympanic membranes were normal [Normal Nasal Mucosa] : the nasal mucosa was normal [No JVD] : no jugular venous distention [No Lymphadenopathy] : no lymphadenopathy [Supple] : supple [No Respiratory Distress] : no respiratory distress  [Clear to Auscultation] : lungs were clear to auscultation bilaterally [Normal Rate] : normal rate  [Regular Rhythm] : with a regular rhythm [Normal S1, S2] : normal S1 and S2 [No Carotid Bruits] : no carotid bruits [Pedal Pulses Present] : the pedal pulses are present [No Edema] : there was no peripheral edema [No Extremity Clubbing/Cyanosis] : no extremity clubbing/cyanosis [Soft] : abdomen soft [Non Tender] : non-tender [Non-distended] : non-distended [Normal Bowel Sounds] : normal bowel sounds [Normal Supraclavicular Nodes] : no supraclavicular lymphadenopathy [Normal Posterior Cervical Nodes] : no posterior cervical lymphadenopathy [Normal Anterior Cervical Nodes] : no anterior cervical lymphadenopathy [No CVA Tenderness] : no CVA  tenderness [No Spinal Tenderness] : no spinal tenderness [No Joint Swelling] : no joint swelling [Grossly Normal Strength/Tone] : grossly normal strength/tone [No Rash] : no rash [Coordination Grossly Intact] : coordination grossly intact [No Focal Deficits] : no focal deficits [Normal Gait] : normal gait [Speech Grossly Normal] : speech grossly normal [Normal Affect] : the affect was normal [Alert and Oriented x3] : oriented to person, place, and time [Normal Mood] : the mood was normal [de-identified] : male in stated age,

## 2022-04-11 PROBLEM — Z11.59 SCREENING FOR VIRAL DISEASE: Status: ACTIVE | Noted: 2020-08-10

## 2022-08-21 ENCOUNTER — INPATIENT (INPATIENT)
Facility: HOSPITAL | Age: 77
LOS: 2 days | Discharge: HOME | DRG: 543 | End: 2022-08-24
Attending: INTERNAL MEDICINE | Admitting: INTERNAL MEDICINE
Payer: MEDICARE

## 2022-08-21 VITALS
HEIGHT: 73 IN | DIASTOLIC BLOOD PRESSURE: 69 MMHG | RESPIRATION RATE: 19 BRPM | HEART RATE: 71 BPM | WEIGHT: 216.93 LBS | SYSTOLIC BLOOD PRESSURE: 155 MMHG | OXYGEN SATURATION: 99 % | TEMPERATURE: 98 F

## 2022-08-21 DIAGNOSIS — C64.9 MALIGNANT NEOPLASM OF UNSPECIFIED KIDNEY, EXCEPT RENAL PELVIS: ICD-10-CM

## 2022-08-21 DIAGNOSIS — Z98.49 CATARACT EXTRACTION STATUS, UNSPECIFIED EYE: Chronic | ICD-10-CM

## 2022-08-21 DIAGNOSIS — M84.454S PATHOLOGICAL FRACTURE, PELVIS, SEQUELA: ICD-10-CM

## 2022-08-21 DIAGNOSIS — Z90.5 ACQUIRED ABSENCE OF KIDNEY: Chronic | ICD-10-CM

## 2022-08-21 DIAGNOSIS — M25.551 PAIN IN RIGHT HIP: ICD-10-CM

## 2022-08-21 DIAGNOSIS — Z98.890 OTHER SPECIFIED POSTPROCEDURAL STATES: Chronic | ICD-10-CM

## 2022-08-21 DIAGNOSIS — Z29.9 ENCOUNTER FOR PROPHYLACTIC MEASURES, UNSPECIFIED: ICD-10-CM

## 2022-08-21 LAB
ALBUMIN SERPL ELPH-MCNC: 4.4 G/DL — SIGNIFICANT CHANGE UP (ref 3.3–5)
ALP SERPL-CCNC: 61 U/L — SIGNIFICANT CHANGE UP (ref 40–120)
ALT FLD-CCNC: 15 U/L — SIGNIFICANT CHANGE UP (ref 10–45)
ANION GAP SERPL CALC-SCNC: 10 MMOL/L — SIGNIFICANT CHANGE UP (ref 5–17)
AST SERPL-CCNC: 26 U/L — SIGNIFICANT CHANGE UP (ref 10–40)
BASOPHILS # BLD AUTO: 0.01 K/UL — SIGNIFICANT CHANGE UP (ref 0–0.2)
BASOPHILS NFR BLD AUTO: 0.2 % — SIGNIFICANT CHANGE UP (ref 0–2)
BILIRUB SERPL-MCNC: 0.6 MG/DL — SIGNIFICANT CHANGE UP (ref 0.2–1.2)
BUN SERPL-MCNC: 14 MG/DL — SIGNIFICANT CHANGE UP (ref 7–23)
CALCIUM SERPL-MCNC: 9 MG/DL — SIGNIFICANT CHANGE UP (ref 8.4–10.5)
CHLORIDE SERPL-SCNC: 105 MMOL/L — SIGNIFICANT CHANGE UP (ref 96–108)
CO2 SERPL-SCNC: 26 MMOL/L — SIGNIFICANT CHANGE UP (ref 22–31)
CREAT SERPL-MCNC: 1.08 MG/DL — SIGNIFICANT CHANGE UP (ref 0.5–1.3)
EGFR: 72 ML/MIN/1.73M2 — SIGNIFICANT CHANGE UP
EOSINOPHIL # BLD AUTO: 0.04 K/UL — SIGNIFICANT CHANGE UP (ref 0–0.5)
EOSINOPHIL NFR BLD AUTO: 0.8 % — SIGNIFICANT CHANGE UP (ref 0–6)
FLUAV AG NPH QL: SIGNIFICANT CHANGE UP
FLUBV AG NPH QL: SIGNIFICANT CHANGE UP
GLUCOSE SERPL-MCNC: 95 MG/DL — SIGNIFICANT CHANGE UP (ref 70–99)
HCT VFR BLD CALC: 44.7 % — SIGNIFICANT CHANGE UP (ref 39–50)
HGB BLD-MCNC: 14.9 G/DL — SIGNIFICANT CHANGE UP (ref 13–17)
IMM GRANULOCYTES NFR BLD AUTO: 0.2 % — SIGNIFICANT CHANGE UP (ref 0–1.5)
LYMPHOCYTES # BLD AUTO: 0.8 K/UL — LOW (ref 1–3.3)
LYMPHOCYTES # BLD AUTO: 16.6 % — SIGNIFICANT CHANGE UP (ref 13–44)
MCHC RBC-ENTMCNC: 33.3 GM/DL — SIGNIFICANT CHANGE UP (ref 32–36)
MCHC RBC-ENTMCNC: 33.9 PG — SIGNIFICANT CHANGE UP (ref 27–34)
MCV RBC AUTO: 101.6 FL — HIGH (ref 80–100)
MONOCYTES # BLD AUTO: 0.54 K/UL — SIGNIFICANT CHANGE UP (ref 0–0.9)
MONOCYTES NFR BLD AUTO: 11.2 % — SIGNIFICANT CHANGE UP (ref 2–14)
NEUTROPHILS # BLD AUTO: 3.42 K/UL — SIGNIFICANT CHANGE UP (ref 1.8–7.4)
NEUTROPHILS NFR BLD AUTO: 71 % — SIGNIFICANT CHANGE UP (ref 43–77)
NRBC # BLD: 0 /100 WBCS — SIGNIFICANT CHANGE UP (ref 0–0)
PLATELET # BLD AUTO: 157 K/UL — SIGNIFICANT CHANGE UP (ref 150–400)
POTASSIUM SERPL-MCNC: 4.6 MMOL/L — SIGNIFICANT CHANGE UP (ref 3.5–5.3)
POTASSIUM SERPL-SCNC: 4.6 MMOL/L — SIGNIFICANT CHANGE UP (ref 3.5–5.3)
PROT SERPL-MCNC: 7.3 G/DL — SIGNIFICANT CHANGE UP (ref 6–8.3)
RBC # BLD: 4.4 M/UL — SIGNIFICANT CHANGE UP (ref 4.2–5.8)
RBC # FLD: 14.6 % — HIGH (ref 10.3–14.5)
RSV RNA NPH QL NAA+NON-PROBE: SIGNIFICANT CHANGE UP
SARS-COV-2 RNA SPEC QL NAA+PROBE: SIGNIFICANT CHANGE UP
SODIUM SERPL-SCNC: 141 MMOL/L — SIGNIFICANT CHANGE UP (ref 135–145)
WBC # BLD: 4.82 K/UL — SIGNIFICANT CHANGE UP (ref 3.8–10.5)
WBC # FLD AUTO: 4.82 K/UL — SIGNIFICANT CHANGE UP (ref 3.8–10.5)

## 2022-08-21 PROCEDURE — 99221 1ST HOSP IP/OBS SF/LOW 40: CPT

## 2022-08-21 PROCEDURE — G1004: CPT

## 2022-08-21 PROCEDURE — 71045 X-RAY EXAM CHEST 1 VIEW: CPT | Mod: 26

## 2022-08-21 PROCEDURE — 99285 EMERGENCY DEPT VISIT HI MDM: CPT | Mod: FS

## 2022-08-21 PROCEDURE — 99223 1ST HOSP IP/OBS HIGH 75: CPT

## 2022-08-21 PROCEDURE — 72190 X-RAY EXAM OF PELVIS: CPT | Mod: 26

## 2022-08-21 PROCEDURE — 72192 CT PELVIS W/O DYE: CPT | Mod: 26,MG

## 2022-08-21 PROCEDURE — 73552 X-RAY EXAM OF FEMUR 2/>: CPT | Mod: 26,RT

## 2022-08-21 PROCEDURE — 73700 CT LOWER EXTREMITY W/O DYE: CPT | Mod: 26,RT,MG

## 2022-08-21 PROCEDURE — 76377 3D RENDER W/INTRP POSTPROCES: CPT | Mod: 26

## 2022-08-21 RX ORDER — ENOXAPARIN SODIUM 100 MG/ML
40 INJECTION SUBCUTANEOUS EVERY 24 HOURS
Refills: 0 | Status: DISCONTINUED | OUTPATIENT
Start: 2022-08-21 | End: 2022-08-24

## 2022-08-21 RX ORDER — ACETAMINOPHEN 500 MG
650 TABLET ORAL EVERY 6 HOURS
Refills: 0 | Status: DISCONTINUED | OUTPATIENT
Start: 2022-08-21 | End: 2022-08-24

## 2022-08-21 RX ORDER — CHOLECALCIFEROL (VITAMIN D3) 125 MCG
2000 CAPSULE ORAL DAILY
Refills: 0 | Status: DISCONTINUED | OUTPATIENT
Start: 2022-08-21 | End: 2022-08-24

## 2022-08-21 NOTE — H&P ADULT - NSICDXPASTSURGICALHX_GEN_ALL_CORE_FT
PAST SURGICAL HISTORY:  H/O left nephrectomy For RCC/ hypernephroma in 2005     PAST SURGICAL HISTORY:  H/O left nephrectomy For RCC/ hypernephroma in 2005    H/O melanoma excision Face presumed in [id number].    S/P cataract surgery B/L.

## 2022-08-21 NOTE — ED PROVIDER NOTE - PHYSICAL EXAMINATION
CONSTITUTIONAL: Patient is awake, alert and oriented x 3. Patient is well appearing and in no acute distress  HEAD: NCAT  NECK: supple, FROM  LUNGS: CTA B/L, no wheezing or rales   HEART: RRR.+S1S2 no murmurs  ABDOMEN: Soft nd/nttp, no rebound or guarding  EXTREMITY: no edema or calf tenderness b/l, FROM b/l upper and LLE, FROM R ankle and knee, decreased ROM R hip. No focal ttp R hip or femur. No midline cervical, thoracic or lumbar ttp  SKIN: with no rash or lesions  NEURO: Cn3-12 grossly intact. Strength 5/5 UE and LLE, RLE w/ 5/5 plantar/dorsi flexion. Unable to lift right leg off bed without assistance but able to hold against gravity x 5 seconds. Nml gross sensation UE/LE b/l

## 2022-08-21 NOTE — ED PROVIDER NOTE - OBJECTIVE STATEMENT
77 year old male with pmhx RCC s/p L nephrectomy 2005, with metastatic Lung lesion dx 2016 and R femur lesion dx May 2022 s/p radiation treatment presents to ED c/o R hip weakness and discomfort with walking. Pt reports that he was away visiting his son in [place] and noticed some discomfort in his R hip while walking over the past few days. Last night was out to dinner and after eating was unable to get up by himself or walk secondary to discomfort and weakness in the R hip. Since has been unable to bear weight on the R leg. Reports crawling to the bathroom last night. Denies falls/trauma, back pain, numbness, tingling, issues with urination or BMs. 77 year old male with pmhx RCC s/p L nephrectomy 2005, with metastatic Lung lesion dx 2016 and R pelvic lesion dx May 2022 s/p radiation treatment presents to ED c/o R hip weakness and discomfort with walking. Pt reports that he was away visiting his son in [place] and noticed some discomfort in his R hip while walking over the past few days. Last night was out to dinner and after eating was unable to get up by himself or walk secondary to discomfort and weakness in the R hip. Since has been unable to bear weight on the R leg. Reports crawling to the bathroom last night. Denies falls/trauma, back pain, numbness, tingling, issues with urination or BMs.

## 2022-08-21 NOTE — ED CLERICAL - NS ED CLERK NOTE PRE-ARRIVAL INFORMATION; ADDITIONAL PRE-ARRIVAL INFORMATION
CC/Reason For referral: merry llanos @ [facility]- hx renal ca with known mets lesion left hip. patient has severe left hip pain   Preferred Consultant(if applicable):  Who admits for you (if needed):  Do you have documents you would like to fax over?  Would you still like to speak to an ED attending?  please call after patient is seen

## 2022-08-21 NOTE — H&P ADULT - NSHPREVIEWOFSYSTEMS_GEN_ALL_CORE
NO HA, no focal weakness.  NO chest pain/pressure.  No palpitations.  NO abdominal pain, no red blood per rectum or melena.  NO back pain, no tearing back pain.  NO rash.    NO weight loss or anorexia.  NO SI/HI.  NO thyroid symptoms.  NO node swelling.  NO saddle anaesthesia or faecal or urinary incontinence. NO HA, no focal weakness.  NO chest pain/pressure.  No palpitations.  NO abdominal pain, no red blood per rectum or melena.  NO back pain, no tearing back pain.  NO rash.    NO weight loss or anorexia.  NO SI/HI.  NO thyroid symptoms.  NO node swelling.  NO saddle anaesthesia or faecal or urinary incontinence.  NO epistaxis.

## 2022-08-21 NOTE — ED PROVIDER NOTE - NS ED ATTENDING STATEMENT MOD
This was a shared visit with the YAHIR. I reviewed and verified the documentation and independently performed the documented:

## 2022-08-21 NOTE — H&P ADULT - NSHPLABSRESULTS_GEN_ALL_CORE
WBC 4.8   normal differential    Hgb 14.9    Platelets of 157K    Alb 4.4    Random glucose of 95    Cr 1.0  K+ 4.6    COVID-19 and RVP>>negative.    CTT pelvis bony with large RIGHT iliac lesion pathologic fracture, smaller lytic lesion LEFT iliac crest consistent with mets.    Chest radiograph reviewed with no infiltrate or effusion.    MRI pelvis and RIGHT femur ordered in the ER.    EKG tracing reviewed with NSR at 72.

## 2022-08-21 NOTE — H&P ADULT - PROBLEM SELECTOR PLAN 2
See above.  Await full oncology recommendations.   Will defer to oncology for patient's current chemotherapy regimen.

## 2022-08-21 NOTE — ED ADULT NURSE NOTE - OBJECTIVE STATEMENT
Patient came in c/o inability to ambulate x2 days. Has known right femur region tumor. pt fell a month ago.  Tumor seen on CT and MRI. s/p radiation therapy with improved ambulation. Pt alert and orientedX4. No distress. Breathing easy and non labored. Pt able to move all extremities. Pt's wife at bedside.

## 2022-08-21 NOTE — ED PROVIDER NOTE - CLINICAL SUMMARY MEDICAL DECISION MAKING FREE TEXT BOX
Attending (Brendan Whiteside D.O.):  77M hx of RCC s/p L nephrectomy, with left lung nodule found to be 2/2 renal cell, here for inability to ambulate x2 days. Has known right femur region tumor. Had a fall months ago with neg xrays. Tumor seen on CT and MRI. s/p radiation therapy with improved ambulation. Now for last 2 days after coming back from [facility], unable to bear weight on RLE. Denies change in sensation, saddle anesthesia, bowel/bladder incont. Able to full range at right ankle and knee. Full str at right ankle and knee. Able to hold up RLE at hip after asssting it up. No midline spinal or paraspinal tenderness. No recent spine manipulation. No infectious sxs. Will need to eval for metabolic derrangement such as hypocalcemia vs tumor regrowth with neuro invasion. Patient w/o any pain. Sensation and pulses at fem and pop intact. Labs, CT.

## 2022-08-21 NOTE — H&P ADULT - NSICDXPASTMEDICALHX_GEN_ALL_CORE_FT
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  Convalescence at home, no treatment.    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

## 2022-08-21 NOTE — H&P ADULT - HISTORY OF PRESENT ILLNESS
NIGHT HOSPITALIST:    Patient UNKNOWN to me previously, assigned to me at this point via the ER and by Dr. Douglas to admit this independent 76 y/o M--followed by his physicians at [facility] as above--patient with S/P LEFT open radical nephrectomy in 2005 following an episode of gross haematuria with hypernephroma, presumed to be in remission, but with close followup at [facility] with progressive subcarinal and RIGHT hilar VIVI in July 2014, thoracic VIVI in 205, with S/P endobronchial US RIGHT hilar core biopsy with recurrence of of hypernephroma, with chemo, with May 2022 with large lytic RIGHT ilium lesion and small change LEFT ilium with RT to the RIGHT pelvis in June 2022.   Patient with episode of COVID-19 in 12/2020 (has had COVID-19 vaccine x 3 since) with patient with convalescence at home with no treatment at the time.  Patient with presumed mechanical fall in Jan 2022 with reported imaging studies negative for fracture and followed by his orthopaedists at [facility] at the time.  Patient was at son's residence in [place] when patient could not weight bear on the RIGHT hip--no fall, no trauma.  Son helped patient to the couch but patient refused hospitalization in [place] and was brought to [place], [place].   Patient presently denies RIGHT hip/left hip pain.  NO leg pain.   NO back pain, no tearing back pain.  No abdominal pain.  NO fever, no chills, no rigors.  NO diaphoresis.  NO anorexia or weight loss.

## 2022-08-21 NOTE — ED PROVIDER NOTE - ATTENDING APP SHARED VISIT CONTRIBUTION OF CARE
Attending (Brendan Whiteside D.O.):  I have personally seen and examined this patient. I have performed a substantive portion of the visit including all aspects of the medical decision making. Resident, fellow, and/or ACP note reviewed. I agree on the plan of care except where noted.    see mdm

## 2022-08-21 NOTE — H&P ADULT - NSHPADDITIONALINFOADULT_GEN_ALL_CORE
NIGHT HOSPITALIST:    Patient /family aware of course and agree with plan/care as above.   The patient did not wish examiner to contact his family at this hour.  Given patient's comorbidities, patient's long term prognosis is guarded.   Emotional support provided to patient.   Care reviewed with covering NP/PA for endorsement to Dr. Douglas.    Ajay Flores MD NIGHT HOSPITALIST:    Patient /family aware of course and agree with plan/care as above.   The patient did not wish examiner to contact his family at this hour.  Given patient's comorbidities, patient's long term prognosis is guarded.   Emotional support provided to patient.   Care reviewed with covering NP/PAEdward  for endorsement to Dr. Douglas.    Ajay Flores MD

## 2022-08-21 NOTE — CONSULT NOTE ADULT - SUBJECTIVE AND OBJECTIVE BOX
HPI: 77M with pmhx RCC s/p L nephrectomy 2005, with metastatic Lung lesion dx 2016 and R pelvic lesion dx May 2022 s/p radiation treatment in June 2022 (follows at [facility]) presents to ED c/o R hip weakness and discomfort with walking. Pt reports that he was away visiting his son in [place] and noticed some discomfort in his R hip while walking over the past few days. Last night was out to dinner and after eating was unable to get up by himself or walk secondary to discomfort and weakness in the R hip. Since has been unable to bear weight on the R leg. Mild to no pain at rest. Denies falls/trauma, back pain, numbness, tingling, issues with urination or BMs.    PAST MEDICAL & SURGICAL HISTORY:  As per HPI    MEDICATIONS  (STANDING):    MEDICATIONS  (PRN):    Allergies    No Known Allergies    Intolerances    Vital Signs Last 24 Hrs  T(C): 36.8 (21 Aug 2022 11:45), Max: 36.8 (21 Aug 2022 11:45)  T(F): 98.3 (21 Aug 2022 11:45), Max: 98.3 (21 Aug 2022 11:45)  HR: 70 (21 Aug 2022 11:45) (70 - 71)  BP: 125/71 (21 Aug 2022 11:45) (125/71 - 155/69)  RR: 18 (21 Aug 2022 11:45) (18 - 19)  SpO2: 98% (21 Aug 2022 11:45) (98% - 99%)  Parameters below as of 21 Aug 2022 11:45  Patient On (Oxygen Delivery Method): room air    PHYSICAL EXAM:  Gen: NAD  Resp: no increased WOB  RLE:  Skin intact, no edema or ecchymosis over hip  No TTP along hip or remainder of extremity; compartments soft  Negative log roll  Negative heel strike  Motor: TA/EHL/GS/FHL intact  Sensory: DP/SP/Tib/Flaca/Saph SILT  +DP pulse, WWP                          14.9   4.82  )-----------( 157      ( 21 Aug 2022 10:03 )             44.7     08-21    141  |  105  |  14  ----------------------------<  95  4.6   |  26  |  1.08    Ca    9.0      21 Aug 2022 10:03    TPro  7.3  /  Alb  4.4  /  TBili  0.6  /  DBili  x   /  AST  26  /  ALT  15  /  AlkPhos  61  08-21      IMAGING STUDIES:  < from: CT Pelvis Bony Only No Cont (08.21.22 @ 11:28) >  ACC: [id number] EXAM:  CT FEMUR ONLY RT                        ACC: [id number] EXAM:  CT 3D RECONSTRUCT W [place]                        ACC: [id number] EXAM:  CT PELVIS BONY ONLY                        ACC: [id number] EXAM:  CT 3D RECONSTRUCT W [place]                          PROCEDURE DATE:  08/21/2022      INTERPRETATION:  CT PELVIS BONY, CT 3D RECONSTRUCTION W WORKSTATION, CT   3D RECONSTRUCTION W WORKSTATION, CT FEMUR RIGHT dated 8/21/2022 11:28 AM    INDICATION: Right hip weakness. History of renal cell carcinoma status   post left nephrectomy. Metastatic lung lesion in 2016. Right femoral   lesion diagnosed in May 2022.    COMPARISON: None available.    TECHNIQUE: CT imaging of the pelvis and right femur was performed. The   data was reformatted in the axial, coronal, and sagittal planes.   Additionally, 3-D reformatted imaging was created.    FINDINGS:    OSSEOUS STRUCTURES: There is a large lytic lesion centered in the right   iliac crest measuring approximately 5.5 x 3 x 6 cm. There is extension   into the adjacent gluteus minimus musculature and, to a lesser extent,   the upper iliacus muscle. A rounded lytic focus is appreciated in the   left iliac crest measuring 1.2 cm with mild overlying periosteal   reaction. A small focus of endosteal erosion is appreciated at the   anterior aspect of the distal femoral cortex at the mid/distal diaphysis   (series 2, image 296). No additional lesion is noted. There is mild   narrowing of the bilateral hip joint spaces. Mild medial knee joint space   narrowing.  SYNOVIUM/ JOINT FLUID: No large effusion is identified.  TENDONS: Enthesopathy at the proximal hamstring tendons bilaterally. Mild   insertional enthesopathy at the abductor tendons. The quadriceps   enthesophyte formation at the insertion.  MUSCLES: Small calcifications within the left medial gastrocnemius   muscle, likely the sequela of a remote strain. No intramuscular hematoma.   Symmetric muscle bulk.  NEUROVASCULAR STRUCTURES: Mild vascular calcifications.  INTRAPELVIC SOFT TISSUES: Enlarged prostate with calcifications.  SUBCUTANEOUS SOFT TISSUES: No soft tissue swelling.    3-D reformatted imaging confirms these findings.    IMPRESSION:    Large right iliac crest lesion with cortical destruction, pathologic   fracture, and extension to the adjacent musculature. Smaller lytic lesion   in the left iliac crest with mild overlying periosteal reaction. Small   endosteal scalloping in the mid anterior femoral cortex. These findings   are concerning for metastatic disease given the history of renal cell   carcinoma.    --- End of Report ---    < end of copied text >      ASSESSMENT  77M with pmhx RCC s/p L nephrectomy 2005, with metastatic Lung lesion dx 2016 and R pelvic lesion dx May 2022 s/p radiation treatment in June 2022 (follows at [facility]) p/w R path ilium fx.    PLAN  - NWB RLE pending imaging  - Pain control  - FU XRs pelvis, R femur  - FU MRI pelvis, R femur w/ & w/o con  - Recommend oncology consult HPI: 77M with pmhx RCC s/p L nephrectomy 2005, with metastatic Lung lesion dx 2016 and R pelvic lesion dx May 2022 s/p radiation treatment in June 2022 (follows at [facility]) presents to ED c/o R hip weakness and discomfort with walking. Pt reports that he was away visiting his son in [place] and noticed some discomfort in his R hip while walking over the past few days. Last night was out to dinner and after eating was unable to get up by himself or walk secondary to discomfort and weakness in the R hip. Since has been unable to bear weight on the R leg. Mild to no pain at rest. Denies falls/trauma, back pain, numbness, tingling, issues with urination or BMs.    PAST MEDICAL & SURGICAL HISTORY:  As per HPI    MEDICATIONS  (STANDING):    MEDICATIONS  (PRN):    Allergies    No Known Allergies    Intolerances    Vital Signs Last 24 Hrs  T(C): 36.8 (21 Aug 2022 11:45), Max: 36.8 (21 Aug 2022 11:45)  T(F): 98.3 (21 Aug 2022 11:45), Max: 98.3 (21 Aug 2022 11:45)  HR: 70 (21 Aug 2022 11:45) (70 - 71)  BP: 125/71 (21 Aug 2022 11:45) (125/71 - 155/69)  RR: 18 (21 Aug 2022 11:45) (18 - 19)  SpO2: 98% (21 Aug 2022 11:45) (98% - 99%)  Parameters below as of 21 Aug 2022 11:45  Patient On (Oxygen Delivery Method): room air    PHYSICAL EXAM:  Gen: NAD  Resp: no increased WOB  RLE:  Skin intact, no edema or ecchymosis over hip  No TTP along hip or remainder of extremity; compartments soft  Negative log roll  Negative heel strike  Motor: TA/EHL/GS/FHL intact  Sensory: DP/SP/Tib/Flaca/Saph SILT  +DP pulse, WWP                          14.9   4.82  )-----------( 157      ( 21 Aug 2022 10:03 )             44.7     08-21    141  |  105  |  14  ----------------------------<  95  4.6   |  26  |  1.08    Ca    9.0      21 Aug 2022 10:03    TPro  7.3  /  Alb  4.4  /  TBili  0.6  /  DBili  x   /  AST  26  /  ALT  15  /  AlkPhos  61  08-21      IMAGING STUDIES:  < from: CT Pelvis Bony Only No Cont (08.21.22 @ 11:28) >  ACC: [id number] EXAM:  CT FEMUR ONLY RT                        ACC: [id number] EXAM:  CT 3D RECONSTRUCT W [place]                        ACC: [id number] EXAM:  CT PELVIS BONY ONLY                        ACC: [id number] EXAM:  CT 3D RECONSTRUCT W [place]                          PROCEDURE DATE:  08/21/2022      INTERPRETATION:  CT PELVIS BONY, CT 3D RECONSTRUCTION W WORKSTATION, CT   3D RECONSTRUCTION W WORKSTATION, CT FEMUR RIGHT dated 8/21/2022 11:28 AM    INDICATION: Right hip weakness. History of renal cell carcinoma status   post left nephrectomy. Metastatic lung lesion in 2016. Right femoral   lesion diagnosed in May 2022.    COMPARISON: None available.    TECHNIQUE: CT imaging of the pelvis and right femur was performed. The   data was reformatted in the axial, coronal, and sagittal planes.   Additionally, 3-D reformatted imaging was created.    FINDINGS:    OSSEOUS STRUCTURES: There is a large lytic lesion centered in the right   iliac crest measuring approximately 5.5 x 3 x 6 cm. There is extension   into the adjacent gluteus minimus musculature and, to a lesser extent,   the upper iliacus muscle. A rounded lytic focus is appreciated in the   left iliac crest measuring 1.2 cm with mild overlying periosteal   reaction. A small focus of endosteal erosion is appreciated at the   anterior aspect of the distal femoral cortex at the mid/distal diaphysis   (series 2, image 296). No additional lesion is noted. There is mild   narrowing of the bilateral hip joint spaces. Mild medial knee joint space   narrowing.  SYNOVIUM/ JOINT FLUID: No large effusion is identified.  TENDONS: Enthesopathy at the proximal hamstring tendons bilaterally. Mild   insertional enthesopathy at the abductor tendons. The quadriceps   enthesophyte formation at the insertion.  MUSCLES: Small calcifications within the left medial gastrocnemius   muscle, likely the sequela of a remote strain. No intramuscular hematoma.   Symmetric muscle bulk.  NEUROVASCULAR STRUCTURES: Mild vascular calcifications.  INTRAPELVIC SOFT TISSUES: Enlarged prostate with calcifications.  SUBCUTANEOUS SOFT TISSUES: No soft tissue swelling.    3-D reformatted imaging confirms these findings.    IMPRESSION:    Large right iliac crest lesion with cortical destruction, pathologic   fracture, and extension to the adjacent musculature. Smaller lytic lesion   in the left iliac crest with mild overlying periosteal reaction. Small   endosteal scalloping in the mid anterior femoral cortex. These findings   are concerning for metastatic disease given the history of renal cell   carcinoma.    --- End of Report ---    < end of copied text >      ASSESSMENT  77M with pmhx RCC s/p L nephrectomy 2005, with metastatic Lung lesion dx 2016 and R pelvic lesion dx May 2022 s/p radiation treatment in June 2022 (follows at [facility]) p/w R path ilium fx.    PLAN  - WBAT RLE  - Pain control  - FU XRs pelvis, R femur  - FU MRI pelvis, R femur w/ & w/o con  - Recommend oncology consult HPI: 77M with pmhx RCC s/p L nephrectomy 2005, with metastatic Lung lesion dx 2016 and R pelvic lesion dx May 2022 s/p radiation treatment in June 2022 (follows at [facility]) presents to ED c/o R hip weakness and discomfort with walking. Pt reports that he was away visiting his son in [place] and noticed some discomfort in his R hip while walking over the past few days. Last night was out to dinner and after eating was unable to get up by himself or walk secondary to discomfort and weakness in the R hip. Since has been unable to bear weight on the R leg. Mild to no pain at rest. Denies falls/trauma, back pain, numbness, tingling, issues with urination or BMs.    PAST MEDICAL & SURGICAL HISTORY:  As per HPI    MEDICATIONS  (STANDING):    MEDICATIONS  (PRN):    Allergies    No Known Allergies    Intolerances    Vital Signs Last 24 Hrs  T(C): 36.8 (21 Aug 2022 11:45), Max: 36.8 (21 Aug 2022 11:45)  T(F): 98.3 (21 Aug 2022 11:45), Max: 98.3 (21 Aug 2022 11:45)  HR: 70 (21 Aug 2022 11:45) (70 - 71)  BP: 125/71 (21 Aug 2022 11:45) (125/71 - 155/69)  RR: 18 (21 Aug 2022 11:45) (18 - 19)  SpO2: 98% (21 Aug 2022 11:45) (98% - 99%)  Parameters below as of 21 Aug 2022 11:45  Patient On (Oxygen Delivery Method): room air    PHYSICAL EXAM:  Gen: NAD  Resp: no increased WOB  RLE:  Skin intact, no edema or ecchymosis over hip  No TTP along hip or remainder of extremity; compartments soft  Negative log roll  Negative heel strike  Motor: TA/EHL/GS/FHL intact  Sensory: DP/SP/Tib/Flaca/Saph SILT  +DP pulse, WWP                          14.9   4.82  )-----------( 157      ( 21 Aug 2022 10:03 )             44.7     08-21    141  |  105  |  14  ----------------------------<  95  4.6   |  26  |  1.08    Ca    9.0      21 Aug 2022 10:03    TPro  7.3  /  Alb  4.4  /  TBili  0.6  /  DBili  x   /  AST  26  /  ALT  15  /  AlkPhos  61  08-21      IMAGING STUDIES:  < from: CT Pelvis Bony Only No Cont (08.21.22 @ 11:28) >  ACC: [id number] EXAM:  CT FEMUR ONLY RT                        ACC: [id number] EXAM:  CT 3D RECONSTRUCT W [place]                        ACC: [id number] EXAM:  CT PELVIS BONY ONLY                        ACC: [id number] EXAM:  CT 3D RECONSTRUCT W [place]                          PROCEDURE DATE:  08/21/2022      INTERPRETATION:  CT PELVIS BONY, CT 3D RECONSTRUCTION W WORKSTATION, CT   3D RECONSTRUCTION W WORKSTATION, CT FEMUR RIGHT dated 8/21/2022 11:28 AM    INDICATION: Right hip weakness. History of renal cell carcinoma status   post left nephrectomy. Metastatic lung lesion in 2016. Right femoral   lesion diagnosed in May 2022.    COMPARISON: None available.    TECHNIQUE: CT imaging of the pelvis and right femur was performed. The   data was reformatted in the axial, coronal, and sagittal planes.   Additionally, 3-D reformatted imaging was created.    FINDINGS:    OSSEOUS STRUCTURES: There is a large lytic lesion centered in the right   iliac crest measuring approximately 5.5 x 3 x 6 cm. There is extension   into the adjacent gluteus minimus musculature and, to a lesser extent,   the upper iliacus muscle. A rounded lytic focus is appreciated in the   left iliac crest measuring 1.2 cm with mild overlying periosteal   reaction. A small focus of endosteal erosion is appreciated at the   anterior aspect of the distal femoral cortex at the mid/distal diaphysis   (series 2, image 296). No additional lesion is noted. There is mild   narrowing of the bilateral hip joint spaces. Mild medial knee joint space   narrowing.  SYNOVIUM/ JOINT FLUID: No large effusion is identified.  TENDONS: Enthesopathy at the proximal hamstring tendons bilaterally. Mild   insertional enthesopathy at the abductor tendons. The quadriceps   enthesophyte formation at the insertion.  MUSCLES: Small calcifications within the left medial gastrocnemius   muscle, likely the sequela of a remote strain. No intramuscular hematoma.   Symmetric muscle bulk.  NEUROVASCULAR STRUCTURES: Mild vascular calcifications.  INTRAPELVIC SOFT TISSUES: Enlarged prostate with calcifications.  SUBCUTANEOUS SOFT TISSUES: No soft tissue swelling.    3-D reformatted imaging confirms these findings.    IMPRESSION:    Large right iliac crest lesion with cortical destruction, pathologic   fracture, and extension to the adjacent musculature. Smaller lytic lesion   in the left iliac crest with mild overlying periosteal reaction. Small   endosteal scalloping in the mid anterior femoral cortex. These findings   are concerning for metastatic disease given the history of renal cell   carcinoma.    --- End of Report ---    < end of copied text >      ASSESSMENT  77M with pmhx RCC s/p L nephrectomy 2005, with metastatic Lung lesion dx 2016 and R pelvic lesion dx May 2022 s/p radiation treatment in June 2022 (follows at [facility]) p/w R path ilium fx.    PLAN  - WBAT RLE  - Pain control  - FU XRs pelvis, R femur  - FU MRI pelvis w/ & w/o con  - Recommend oncology consult Metronidazole Pregnancy And Lactation Text: This medication is Pregnancy Category B and considered safe during pregnancy.  It is also excreted in breast milk.

## 2022-08-21 NOTE — H&P ADULT - NSHPSOURCEINFOTX_GEN_ALL_CORE
Reviewed Medex with patient.   Family aware of admission (son is a pediatric nephrology fellow at [place], [place]) and the patient did not wish for the examiner to contact family at this hour.

## 2022-08-21 NOTE — H&P ADULT - NSHPSOCIALHISTORY_GEN_ALL_CORE
NO tobacco, ethanol or recreational substance use.   Independent.   Son is a pediatric nephrology fellow at [place], [place].

## 2022-08-21 NOTE — ED PROVIDER NOTE - PROGRESS NOTE DETAILS
[facility] requesting Dr. Bishop for admission, spoke to Dario who states he is away. Dr. Douglas covering for him. Dr. Douglas accepted admission  Siri Tate PA-C Attending (Brendan Whiteside D.O.):  w/u, all results of imaging and blood work discusse with patient and family. All question answered. Remains neurovasc intact

## 2022-08-21 NOTE — CONSULT NOTE ADULT - ATTENDING COMMENTS
Agree with above. 77M w/ hx of RCC s/p L nephrectomy (2005, w/ met lung (2016), R ilium lesion (5/2022) s/p XRT (6/2022, [facility]), presented with few days of severe R hip pain. No pain at rest, but +pain when attempting to stand.     Imaging revealed a [id number] lesion involving the anterior R ilium.    Plan:  WBAT w/ assistance  FU MRI pelvis.  May consider surgical excision at some point if symptoms do not improve.

## 2022-08-21 NOTE — H&P ADULT - ASSESSMENT
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   Seen by orthopaedics in the ER.   For MRI pelvis and RIGHT femur.  NON weight bearing B/L LE but physical therapy requested.    Patient agrees to pharmacologic DVT prophylaxis.

## 2022-08-21 NOTE — H&P ADULT - PROBLEM SELECTOR PLAN 1
See above.   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  NON weight bearing for B/L LE for now.   PT.   Ortho following.  MRI pelvis and RIGHT femur ordered.

## 2022-08-22 ENCOUNTER — NON-APPOINTMENT (OUTPATIENT)
Age: 77
End: 2022-08-22

## 2022-08-22 LAB
ANION GAP SERPL CALC-SCNC: 12 MMOL/L — SIGNIFICANT CHANGE UP (ref 5–17)
BASOPHILS # BLD AUTO: 0.02 K/UL — SIGNIFICANT CHANGE UP (ref 0–0.2)
BASOPHILS NFR BLD AUTO: 0.5 % — SIGNIFICANT CHANGE UP (ref 0–2)
BUN SERPL-MCNC: 13 MG/DL — SIGNIFICANT CHANGE UP (ref 7–23)
CALCIUM SERPL-MCNC: 8.6 MG/DL — SIGNIFICANT CHANGE UP (ref 8.4–10.5)
CHLORIDE SERPL-SCNC: 107 MMOL/L — SIGNIFICANT CHANGE UP (ref 96–108)
CO2 SERPL-SCNC: 22 MMOL/L — SIGNIFICANT CHANGE UP (ref 22–31)
CREAT SERPL-MCNC: 1.07 MG/DL — SIGNIFICANT CHANGE UP (ref 0.5–1.3)
EGFR: 72 ML/MIN/1.73M2 — SIGNIFICANT CHANGE UP
EOSINOPHIL # BLD AUTO: 0.07 K/UL — SIGNIFICANT CHANGE UP (ref 0–0.5)
EOSINOPHIL NFR BLD AUTO: 1.6 % — SIGNIFICANT CHANGE UP (ref 0–6)
GLUCOSE SERPL-MCNC: 75 MG/DL — SIGNIFICANT CHANGE UP (ref 70–99)
HCT VFR BLD CALC: 42.1 % — SIGNIFICANT CHANGE UP (ref 39–50)
HCV AB S/CO SERPL IA: 0.08 S/CO — SIGNIFICANT CHANGE UP (ref 0–0.99)
HCV AB SERPL-IMP: SIGNIFICANT CHANGE UP
HGB BLD-MCNC: 13.8 G/DL — SIGNIFICANT CHANGE UP (ref 13–17)
IMM GRANULOCYTES NFR BLD AUTO: 0.2 % — SIGNIFICANT CHANGE UP (ref 0–1.5)
LYMPHOCYTES # BLD AUTO: 0.78 K/UL — LOW (ref 1–3.3)
LYMPHOCYTES # BLD AUTO: 18.1 % — SIGNIFICANT CHANGE UP (ref 13–44)
MCHC RBC-ENTMCNC: 32.8 GM/DL — SIGNIFICANT CHANGE UP (ref 32–36)
MCHC RBC-ENTMCNC: 33.1 PG — SIGNIFICANT CHANGE UP (ref 27–34)
MCV RBC AUTO: 101 FL — HIGH (ref 80–100)
MONOCYTES # BLD AUTO: 0.53 K/UL — SIGNIFICANT CHANGE UP (ref 0–0.9)
MONOCYTES NFR BLD AUTO: 12.3 % — SIGNIFICANT CHANGE UP (ref 2–14)
NEUTROPHILS # BLD AUTO: 2.91 K/UL — SIGNIFICANT CHANGE UP (ref 1.8–7.4)
NEUTROPHILS NFR BLD AUTO: 67.3 % — SIGNIFICANT CHANGE UP (ref 43–77)
NRBC # BLD: 0 /100 WBCS — SIGNIFICANT CHANGE UP (ref 0–0)
PLATELET # BLD AUTO: 164 K/UL — SIGNIFICANT CHANGE UP (ref 150–400)
POTASSIUM SERPL-MCNC: 4.1 MMOL/L — SIGNIFICANT CHANGE UP (ref 3.5–5.3)
POTASSIUM SERPL-SCNC: 4.1 MMOL/L — SIGNIFICANT CHANGE UP (ref 3.5–5.3)
RBC # BLD: 4.17 M/UL — LOW (ref 4.2–5.8)
RBC # FLD: 14.6 % — HIGH (ref 10.3–14.5)
SODIUM SERPL-SCNC: 141 MMOL/L — SIGNIFICANT CHANGE UP (ref 135–145)
WBC # BLD: 4.32 K/UL — SIGNIFICANT CHANGE UP (ref 3.8–10.5)
WBC # FLD AUTO: 4.32 K/UL — SIGNIFICANT CHANGE UP (ref 3.8–10.5)

## 2022-08-22 PROCEDURE — 72197 MRI PELVIS W/O & W/DYE: CPT | Mod: 26

## 2022-08-22 RX ADMIN — Medication 2000 UNIT(S): at 11:56

## 2022-08-22 RX ADMIN — ENOXAPARIN SODIUM 40 MILLIGRAM(S): 100 INJECTION SUBCUTANEOUS at 05:24

## 2022-08-22 NOTE — PHYSICAL THERAPY INITIAL EVALUATION ADULT - ADDITIONAL COMMENTS
Pt lives with his wife in  a private house with 2 small steps to enter, no handrail. bedroom is on second floor but family has already moved bed to main floor family room. Pt was independent with mobility prior to 2 days ago.

## 2022-08-22 NOTE — CONSULT NOTE ADULT - NS ATTEND AMEND GEN_ALL_CORE FT
Metastatic renal carcinoma on palbociclib  He has a known R ilium lesion [place] [facility] [place] with worsening hip pain, difficulty ambulating.    Imaging revealed a 5.5 x 3 x 6 cm lytic iliac crest lesion extension into the adjacent gluteus minimus musculature and, to a lesser extent,   the upper iliacus muscle. Small endosteal scalloping in the mid anterior femoral cortex.  Lesion appears to be unchanged from CT [place] [facility] [place].  Await ortho oncology follow up regarding if intervention is necessary, MRI of pelvis pending.

## 2022-08-22 NOTE — PHYSICAL THERAPY INITIAL EVALUATION ADULT - PERTINENT HX OF CURRENT PROBLEM, REHAB EVAL
76 y/o M--[place] [facility]pt with S/P LEFT open radical nephrectomy in 2005 following an episode of gross haematuria with hypernephroma, presumed to be in remission, but with close followup at [facility] with progressive subcarinal and R hilar VIVI in July 2014, thoracic VIVI in 2015, with S/P endobronchial US RIGHT hilar core biopsy with recurrence of of hypernephroma, with chemo, with May 2022 with large lytic R ilium lesion and small change L ilium with RT to the R pelvis in June 2022. Pt had episode of COVID-19 in 12/2020 (has had COVID-19 vaccine x 3 since) with pt with convalescence at home and no treatment at the time.  Pt with presumed mechanical fall in Jan 2022 with reported imaging studies negative for fx and followed by his ortho at [facility] at the time.  Pt was at son's residence in [place] when pt could not weight bear on the R hip--no fall, no trauma. Pt refused hospitalization in [place] and was brought to [place], [place]. Pt presently denies R hip/left hip pain. CT Bony Pelvis: Large R  iliac crest lesion with cortical destruction, pathologic fx, and extension to the adjacent musculature. Smaller lytic lesion in the L iliac crest with mild overlying periosteal reaction. Small endosteal scalloping in the mid anterior femoral cortex. These findings are concerning for metastatic disease given the history of renal cell carcinoma. MRI PElvis pending.

## 2022-08-22 NOTE — CONSULT NOTE ADULT - SUBJECTIVE AND OBJECTIVE BOX
Reason for consult: Metastatic Renal Cell Carcinoma    HPI:  NIGHT HOSPITALIST:    Patient UNKNOWN to me previously, assigned to me at this point via the ER and by Dr. Douglas to admit this independent 76 y/o M--followed by his physicians at [facility] as above--patient with S/P LEFT open radical nephrectomy in 2005 following an episode of gross haematuria with hypernephroma, presumed to be in remission, but with close followup at [facility] with progressive subcarinal and RIGHT hilar VIVI in July 2014, thoracic VIVI in 205, with S/P endobronchial US RIGHT hilar core biopsy with recurrence of of hypernephroma, with chemo, with May 2022 with large lytic RIGHT ilium lesion and small change LEFT ilium with RT to the RIGHT pelvis in June 2022.   Patient with episode of COVID-19 in 12/2020 (has had COVID-19 vaccine x 3 since) with patient with convalescence at home with no treatment at the time.  Patient with presumed mechanical fall in Jan 2022 with reported imaging studies negative for fracture and followed by his orthopaedists at [facility] at the time.  Patient was at son's residence in [place] when patient could not weight bear on the RIGHT hip--no fall, no trauma.  Son helped patient to the couch but patient refused hospitalization in [place] and was brought to [place], [place].   Patient presently denies RIGHT hip/left hip pain.  NO leg pain.   NO back pain, no tearing back pain.  No abdominal pain.  NO fever, no chills, no rigors.  NO diaphoresis.  NO anorexia or weight loss.  (21 Aug 2022 19:06)    Hematology/Oncology called to see patient who is under care by [facility] Dr. Mark Trinidad for the treatment of metastatic renal cell carcinoma (biopsy proven mets to the LN's, bone) S/P RT to L hip 5/2022 currently on palbociclib 200 mg PO daily (dose modified secondary to skin and hepatic toxicities).     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  Convalescence at home, no treatment.      H/O left nephrectomy  For RCC/ hypernephroma in 2005      H/O melanoma excision  Face presumed in [id number].      S/P cataract surgery  B/L.          FAMILY HISTORY:  Family history of colon cancer in mother (Mother)  Lung CA at 82.   Passed away at age 88 with brain tumour.        Alochol: Denied  Smoking: Nonsmoker  Drug Use: Denied  Marital Status:         Allergies    No Known Allergies    Intolerances        MEDICATIONS  (STANDING):  cholecalciferol 2000 Unit(s) Oral daily  enoxaparin Injectable 40 milliGRAM(s) SubCutaneous every 24 hours    MEDICATIONS  (PRN):  acetaminophen     Tablet .. 650 milliGRAM(s) Oral every 6 hours PRN Temp greater or equal to 38C (100.4F), Mild Pain (1 - 3)      ROS  No fever, sweats, chills  No epistaxis, HA, sore throat  No CP, SOB, cough, sputum  No n/v/d, abd pain, melena, hematochezia  No edema  No rash  No anxiety  Unable to bear weight right hip  No bleeding, bruising  No dysuria, hematuria    T(C): 36.9 (08-22-22 @ 04:28), Max: 37.2 (08-21-22 @ 19:30)  HR: 68 (08-22-22 @ 04:28) (66 - 74)  BP: 123/76 (08-22-22 @ 04:28) (112/71 - 155/69)  RR: 18 (08-22-22 @ 04:28) (18 - 20)  SpO2: 96% (08-22-22 @ 04:28) (96% - 99%)  Wt(kg): --    PE  NAD  Awake, alert  Anicteric, MMM  RRR  CTAB  Abd soft, NT, ND  No c/c/e  No rash grossly                            14.9   4.82  )-----------( 157      ( 21 Aug 2022 10:03 )             44.7       08-21    141  |  105  |  14  ----------------------------<  95  4.6   |  26  |  1.08    Ca    9.0      21 Aug 2022 10:03    TPro  7.3  /  Alb  4.4  /  TBili  0.6  /  DBili  x   /  AST  26  /  ALT  15  /  AlkPhos  61  08-21      ACC: [id number] EXAM:  XR PELVIS COMPLETE MIN 3 VIEWS                        ACC: [id number] EXAM:  XR FEMUR 2 VIEWS RT                          PROCEDURE DATE:  08/21/2022          INTERPRETATION:  CLINICAL INDICATION: Right hip weakness. History of   renal cell carcinoma status post left nephrectomy. Metastatic lung lesion   in 2016. Right femoral lesion diagnosed in May 2022.    TECHNIQUE: AP pelvis and two-view right femur.    COMPARISON: CT femur and pelvis 8/21/2022 FINDINGS:  No fracture or dislocation. 7.6 x 7.4 cm right iliac wing lytic bone   lesion.  Preserved bilateral  hip joint space. The sacroiliac joints and pubic   symphysis remain intact. Intact pelvic and obturator rings.  Unremarkable remaining imaged pelvic osseous structures.    IMPRESSION:  Large right iliac crest lesion with cortical destruction.    --- End of Report --      ACC: [id number] EXAM:  CT FEMUR ONLY RT                        ACC: [id number] EXAM:  CT 3D RECONSTRUCT W [facility]                        ACC: [id number] EXAM:  CT PELVIS BONY ONLY                        ACC: [id number] EXAM:  [facility]                          PROCEDURE DATE:  08/21/2022          INTERPRETATION:  CT PELVIS BONY, CT 3D RECONSTRUCTION W WORKSTATION, CT   3D RECONSTRUCTION W WORKSTATION, CT FEMUR RIGHT dated 8/21/2022 11:28 AM    INDICATION: Right hip weakness. History of renal cell carcinoma status   post left nephrectomy. Metastatic lung lesion in 2016. Right femoral   lesion diagnosed in May 2022.    COMPARISON: None available.    TECHNIQUE: CT imaging of the pelvis and right femur was performed. The   data was reformatted in the axial, coronal, and sagittal planes.   Additionally, 3-D reformatted imaging was created.    FINDINGS:    OSSEOUS STRUCTURES: There is a large lytic lesion centered in the right   iliac crest measuring approximately 5.5 x 3 x 6 cm. There is extension   into the adjacent gluteus minimus musculature and, to a lesser extent,   the upper iliacus muscle. A rounded lytic focus is appreciated in the   left iliac crest measuring 1.2 cm with mild overlying periosteal   reaction. A small focus of endosteal erosion is appreciated at the   anterior aspect of the distal femoral cortex at the mid/distal diaphysis   (series 2, image 296). No additional lesion is noted. There is mild   narrowing of the bilateral hip joint spaces. Mild medial knee joint space   narrowing.  SYNOVIUM/ JOINT FLUID: No large effusion is identified.  TENDONS: Enthesopathy at the proximal hamstring tendons bilaterally. Mild   insertional enthesopathy at the abductor tendons. The quadriceps   enthesophyte formation at the insertion.  MUSCLES: Small calcifications within the left medial gastrocnemius   muscle, likely the sequela of a remote strain. No intramuscular hematoma.   Symmetric muscle bulk.  NEUROVASCULAR STRUCTURES: Mild vascular calcifications.  INTRAPELVIC SOFT TISSUES: Enlarged prostate with calcifications.  SUBCUTANEOUS SOFT TISSUES: No soft tissue swelling.    3-D reformatted imaging confirms these findings.    IMPRESSION:    Large right iliac crest lesion with cortical destruction, pathologic   fracture, and extension to the adjacent musculature. Smaller lytic lesion   in the left iliac crest with mild overlying periosteal reaction. Small   endosteal scalloping in the mid anterior femoral cortex. These findings   are concerning for metastatic disease given the history of renal cell   carcinoma.    --- End of Report ---     Reason for consult: Metastatic Renal Cell Carcinoma    HPI:  NIGHT HOSPITALIST:    Patient UNKNOWN to me previously, assigned to me at this point via the ER and by Dr. Douglas to admit this independent 78 y/o M--followed by his physicians at [facility] as above--patient with S/P LEFT open radical nephrectomy in 2005 following an episode of gross haematuria with hypernephroma, presumed to be in remission, but with close followup at [facility] with progressive subcarinal and RIGHT hilar VIVI in July 2014, thoracic VIVI in 205, with S/P endobronchial US RIGHT hilar core biopsy with recurrence of of hypernephroma, with chemo, with May 2022 with large lytic RIGHT ilium lesion and small change LEFT ilium with RT to the RIGHT pelvis in June 2022.   Patient with episode of COVID-19 in 12/2020 (has had COVID-19 vaccine x 3 since) with patient with convalescence at home with no treatment at the time.  Patient with presumed mechanical fall in Jan 2022 with reported imaging studies negative for fracture and followed by his orthopaedists at [facility] at the time.  Patient was at son's residence in [place] when patient could not weight bear on the RIGHT hip--no fall, no trauma.  Son helped patient to the couch but patient refused hospitalization in [place] and was brought to [place], [place].   Patient presently denies RIGHT hip/left hip pain.  NO leg pain.   NO back pain, no tearing back pain.  No abdominal pain.  NO fever, no chills, no rigors.  NO diaphoresis.  NO anorexia or weight loss.  (21 Aug 2022 19:06)    Hematology/Oncology called to see patient who is under care by [facility] Dr. Mark Trinidad for the treatment of metastatic renal cell carcinoma (biopsy proven mets to the LN's, bone) S/P RT R hip 5/2022 currently on palbociclib 200 mg PO daily (dose modified secondary to skin and hepatic toxicities).     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  Convalescence at home, no treatment.      H/O left nephrectomy  For RCC/ hypernephroma in 2005      H/O melanoma excision  Face presumed in [id number].      S/P cataract surgery  B/L.          FAMILY HISTORY:  Family history of colon cancer in mother (Mother)  Lung CA at 82.   Passed away at age 88 with brain tumour.        Alochol: Denied  Smoking: Nonsmoker  Drug Use: Denied  Marital Status:         Allergies    No Known Allergies    Intolerances        MEDICATIONS  (STANDING):  cholecalciferol 2000 Unit(s) Oral daily  enoxaparin Injectable 40 milliGRAM(s) SubCutaneous every 24 hours    MEDICATIONS  (PRN):  acetaminophen     Tablet .. 650 milliGRAM(s) Oral every 6 hours PRN Temp greater or equal to 38C (100.4F), Mild Pain (1 - 3)      ROS  No fever, sweats, chills  No epistaxis, HA, sore throat  No CP, SOB, cough, sputum  No n/v/d, abd pain, melena, hematochezia  No edema  No rash  No anxiety  Unable to bear weight right hip  No bleeding, bruising  No dysuria, hematuria    T(C): 36.9 (08-22-22 @ 04:28), Max: 37.2 (08-21-22 @ 19:30)  HR: 68 (08-22-22 @ 04:28) (66 - 74)  BP: 123/76 (08-22-22 @ 04:28) (112/71 - 155/69)  RR: 18 (08-22-22 @ 04:28) (18 - 20)  SpO2: 96% (08-22-22 @ 04:28) (96% - 99%)  Wt(kg): --    PE  NAD  Awake, alert  Anicteric, MMM  RRR  CTAB  Abd soft, NT, ND  No c/c/e  No rash grossly                            14.9   4.82  )-----------( 157      ( 21 Aug 2022 10:03 )             44.7       08-21    141  |  105  |  14  ----------------------------<  95  4.6   |  26  |  1.08    Ca    9.0      21 Aug 2022 10:03    TPro  7.3  /  Alb  4.4  /  TBili  0.6  /  DBili  x   /  AST  26  /  ALT  15  /  AlkPhos  61  08-21      ACC: [id number] EXAM:  XR PELVIS COMPLETE MIN 3 VIEWS                        ACC: [id number] EXAM:  XR FEMUR 2 VIEWS RT                          PROCEDURE DATE:  08/21/2022          INTERPRETATION:  CLINICAL INDICATION: Right hip weakness. History of   renal cell carcinoma status post left nephrectomy. Metastatic lung lesion   in 2016. Right femoral lesion diagnosed in May 2022.    TECHNIQUE: AP pelvis and two-view right femur.    COMPARISON: CT femur and pelvis 8/21/2022 FINDINGS:  No fracture or dislocation. 7.6 x 7.4 cm right iliac wing lytic bone   lesion.  Preserved bilateral  hip joint space. The sacroiliac joints and pubic   symphysis remain intact. Intact pelvic and obturator rings.  Unremarkable remaining imaged pelvic osseous structures.    IMPRESSION:  Large right iliac crest lesion with cortical destruction.    --- End of Report --      ACC: [id number] EXAM:  CT FEMUR ONLY RT                        ACC: [id number] EXAM:  CT 3D RECONSTRUCT W [facility]                        ACC: [id number] EXAM:  CT PELVIS BONY ONLY                        ACC: [id number] EXAM:  [facility]                          PROCEDURE DATE:  08/21/2022          INTERPRETATION:  CT PELVIS BONY, CT 3D RECONSTRUCTION W WORKSTATION, CT   3D RECONSTRUCTION W WORKSTATION, CT FEMUR RIGHT dated 8/21/2022 11:28 AM    INDICATION: Right hip weakness. History of renal cell carcinoma status   post left nephrectomy. Metastatic lung lesion in 2016. Right femoral   lesion diagnosed in May 2022.    COMPARISON: None available.    TECHNIQUE: CT imaging of the pelvis and right femur was performed. The   data was reformatted in the axial, coronal, and sagittal planes.   Additionally, 3-D reformatted imaging was created.    FINDINGS:    OSSEOUS STRUCTURES: There is a large lytic lesion centered in the right   iliac crest measuring approximately 5.5 x 3 x 6 cm. There is extension   into the adjacent gluteus minimus musculature and, to a lesser extent,   the upper iliacus muscle. A rounded lytic focus is appreciated in the   left iliac crest measuring 1.2 cm with mild overlying periosteal   reaction. A small focus of endosteal erosion is appreciated at the   anterior aspect of the distal femoral cortex at the mid/distal diaphysis   (series 2, image 296). No additional lesion is noted. There is mild   narrowing of the bilateral hip joint spaces. Mild medial knee joint space   narrowing.  SYNOVIUM/ JOINT FLUID: No large effusion is identified.  TENDONS: Enthesopathy at the proximal hamstring tendons bilaterally. Mild   insertional enthesopathy at the abductor tendons. The quadriceps   enthesophyte formation at the insertion.  MUSCLES: Small calcifications within the left medial gastrocnemius   muscle, likely the sequela of a remote strain. No intramuscular hematoma.   Symmetric muscle bulk.  NEUROVASCULAR STRUCTURES: Mild vascular calcifications.  INTRAPELVIC SOFT TISSUES: Enlarged prostate with calcifications.  SUBCUTANEOUS SOFT TISSUES: No soft tissue swelling.    3-D reformatted imaging confirms these findings.    IMPRESSION:    Large right iliac crest lesion with cortical destruction, pathologic   fracture, and extension to the adjacent musculature. Smaller lytic lesion   in the left iliac crest with mild overlying periosteal reaction. Small   endosteal scalloping in the mid anterior femoral cortex. These findings   are concerning for metastatic disease given the history of renal cell   carcinoma.    --- End of Report ---

## 2022-08-22 NOTE — PATIENT PROFILE ADULT - FALL HARM RISK - HARM RISK INTERVENTIONS

## 2022-08-22 NOTE — CONSULT NOTE ADULT - ASSESSMENT
NIGHT HOSPITALIST:    Patient UNKNOWN to me previously, assigned to me at this point via the ER and by Dr. Douglas to admit this independent 78 y/o M--followed by his physicians at [facility] as above--patient with S/P LEFT open radical nephrectomy in 2005 following an episode of gross haematuria with hypernephroma, presumed to be in remission, but with close followup at [facility] with progressive subcarinal and RIGHT hilar VIVI in July 2014, thoracic VIVI in 205, with S/P endobronchial US RIGHT hilar core biopsy with recurrence of of hypernephroma, with chemo, with May 2022 with large lytic RIGHT ilium lesion and small change LEFT ilium with RT to the RIGHT pelvis in June 2022.   Patient with episode of COVID-19 in 12/2020 (has had COVID-19 vaccine x 3 since) with patient with convalescence at home with no treatment at the time.  Patient with presumed mechanical fall in Jan 2022 with reported imaging studies negative for fracture and followed by his orthopaedists at [facility] at the time.  Patient was at son's residence in [place] when patient could not weight bear on the RIGHT hip--no fall, no trauma.  Son helped patient to the couch but patient refused hospitalization in [place] and was brought to [place], [place].   Patient presently denies RIGHT hip/left hip pain.  NO leg pain.   NO back pain, no tearing back pain.  No abdominal pain.  NO fever, no chills, no rigors.  NO diaphoresis.  NO anorexia or weight loss.  (21 Aug 2022 19:06)    Hematology/Oncology called to see patient who is under care by [facility] Dr. Mark Trinidad for the treatment of metastatic renal cell carcinoma (biopsy proven mets to the LN's, bone) S/P RT to L hip 5/2022 currently on palbociclib 200 mg PO daily (dose modified secondary to skin and hepatic toxicities).     Metastatic Renal Cell Carcinoma  --Under care by Dr. Mark Trinidad of [facility]  --Disease limited to bone and lymph nodes  --Would hold palbociclib during admission and resume after patient sees Dr. Trinidad after discharge    Pathologic fracture right iliac crest  --Non-weight bearing involvement per CT  --Pending MRI pelvis and femur for additional information  --Management per Orthopedics  --May need RT consultation for pain control  --May contact palliative for additional pain management as well    We will continue to follow patient and coordinate with [facility].    Casey Pope PA-C  Hematology/Oncology  [facility]   [phone] (office)  [phone] (alt office)  Evenings and weekends please call MD on call or office   NIGHT HOSPITALIST:    Patient UNKNOWN to me previously, assigned to me at this point via the ER and by Dr. Douglas to admit this independent 76 y/o M--followed by his physicians at [facility] as above--patient with S/P LEFT open radical nephrectomy in 2005 following an episode of gross haematuria with hypernephroma, presumed to be in remission, but with close followup at [facility] with progressive subcarinal and RIGHT hilar VIVI in July 2014, thoracic VIVI in 205, with S/P endobronchial US RIGHT hilar core biopsy with recurrence of of hypernephroma, with chemo, with May 2022 with large lytic RIGHT ilium lesion and small change LEFT ilium with RT to the RIGHT pelvis in June 2022.   Patient with episode of COVID-19 in 12/2020 (has had COVID-19 vaccine x 3 since) with patient with convalescence at home with no treatment at the time.  Patient with presumed mechanical fall in Jan 2022 with reported imaging studies negative for fracture and followed by his orthopaedists at [facility] at the time.  Patient was at son's residence in [place] when patient could not weight bear on the RIGHT hip--no fall, no trauma.  Son helped patient to the couch but patient refused hospitalization in [place] and was brought to [place], [place].   Patient presently denies RIGHT hip/left hip pain.  NO leg pain.   NO back pain, no tearing back pain.  No abdominal pain.  NO fever, no chills, no rigors.  NO diaphoresis.  NO anorexia or weight loss.  (21 Aug 2022 19:06)    Hematology/Oncology called to see patient who is under care by [facility] Dr. Mark Trinidad for the treatment of metastatic renal cell carcinoma (biopsy proven mets to the LN's, bone) S/P RT to R hip 5/2022 currently on palbociclib 200 mg PO daily (dose modified secondary to skin and hepatic toxicities).     Metastatic Renal Cell Carcinoma  --Under care by Dr. Mark Trinidad of [facility]  --Disease limited to bone and lymph nodes  --Would hold palbociclib during admission and resume after patient sees Dr. Trinidad after discharge  --Pending PET/CT next week    Pathologic fracture right iliac crest  --Non-weight bearing involvement per CT  --Pending MRI  --Management per Orthopedic Oncology  --May need RT consultation for pain control - if stable could get as outpatient  --May contact palliative for additional pain management as well    Metastatic focus left femur  --Small endosteal scalloping in the mid anterior femoral cortex.  --Pending MRI  --Patient has been seen by Orthopedic Oncology    We will continue to follow patient and coordinate with [facility].    Casey Pope PA-C  Hematology/Oncology  [facility]   [phone] (office)  [phone] (alt office)  Evenings and weekends please call MD on call or office

## 2022-08-23 RX ADMIN — ENOXAPARIN SODIUM 40 MILLIGRAM(S): 100 INJECTION SUBCUTANEOUS at 05:48

## 2022-08-23 RX ADMIN — Medication 2000 UNIT(S): at 11:35

## 2022-08-23 NOTE — OCCUPATIONAL THERAPY INITIAL EVALUATION ADULT - PERTINENT HX OF CURRENT PROBLEM, REHAB EVAL
78 y/o M--followed by his physicians at [facility]--pt with S/P LEFT open radical nephrectomy in 2005 following an episode of gross haematuria with hypernephroma, presumed to be in remission, but with close followup at [facility] with progressive subcarinal and R hilar VIVI in July 2014, thoracic VIVI in 2015, with S/P endobronchial US RIGHT hilar core biopsy with recurrence of of hypernephroma, with chemo, with May 2022 with large lytic R ilium lesion and small change L ilium with RT to the R pelvis in June 2022. Pt had episode of COVID-19 in 12/2020 (has had COVID-19 vaccine x 3 since) with pt with convalescence at home and no treatment at the time.  Pt with presumed mechanical fall in Jan 2022 with reported imaging studies negative for fx and followed by his ortho at [facility] at the time.  Pt was at son's residence in [place] when pt could not weight bear on the R hip--no fall, no trauma. Pt refused hospitalization in [place] and was brought to [place], [place]. Pt presently denies R hip/left hip pain. CT Bony Pelvis: Large R  iliac crest lesion with cortical destruction, pathologic fx, and extension to the adjacent musculature. Smaller lytic lesion in the L iliac crest with mild overlying periosteal reaction. Small endosteal scalloping in the mid anterior femoral cortex. These findings are concerning for metastatic disease given the history of renal cell carcinoma.   MRI Pelvis: 1. Expansile lesion of the right iliac crest is again seen measuring approximately 5.7 x 4.4 cm with peripheral enhancement and surrounding soft tissue edema with enhancement. Surrounding marrow edema could reflect a slight pathologic fracture.  2. 1.5 x 1.0 cm enhancing lesion is present within the left iliac crest.

## 2022-08-23 NOTE — OCCUPATIONAL THERAPY INITIAL EVALUATION ADULT - LIVES WITH, PROFILE
Pt lives w/ wife in pvt house with 2 GARY (no steps inside), walk in shower. Pt denies use of AE/DME, PTA was independent with all ADLs

## 2022-08-23 NOTE — OCCUPATIONAL THERAPY INITIAL EVALUATION ADULT - RANGE OF MOTION EXAMINATION, LOWER EXTREMITY
Left LE Active ROM was WNL  (within normal limits)/Right LE Active Assistive ROM was WNL  (within normal limits)

## 2022-08-24 ENCOUNTER — TRANSCRIPTION ENCOUNTER (OUTPATIENT)
Age: 77
End: 2022-08-24

## 2022-08-24 ENCOUNTER — NON-APPOINTMENT (OUTPATIENT)
Age: 77
End: 2022-08-24

## 2022-08-24 VITALS
DIASTOLIC BLOOD PRESSURE: 72 MMHG | TEMPERATURE: 99 F | HEART RATE: 93 BPM | OXYGEN SATURATION: 96 % | SYSTOLIC BLOOD PRESSURE: 106 MMHG | RESPIRATION RATE: 18 BRPM

## 2022-08-24 PROBLEM — C64.2 MALIGNANT NEOPLASM OF LEFT KIDNEY, EXCEPT RENAL PELVIS: Chronic | Status: ACTIVE | Noted: 2022-08-21

## 2022-08-24 PROBLEM — U07.1 COVID-19: Chronic | Status: ACTIVE | Noted: 2022-08-21

## 2022-08-24 PROCEDURE — 80048 BASIC METABOLIC PNL TOTAL CA: CPT

## 2022-08-24 PROCEDURE — 72192 CT PELVIS W/O DYE: CPT | Mod: MG

## 2022-08-24 PROCEDURE — 73700 CT LOWER EXTREMITY W/O DYE: CPT | Mod: MG

## 2022-08-24 PROCEDURE — 97162 PT EVAL MOD COMPLEX 30 MIN: CPT

## 2022-08-24 PROCEDURE — 80053 COMPREHEN METABOLIC PANEL: CPT

## 2022-08-24 PROCEDURE — 71045 X-RAY EXAM CHEST 1 VIEW: CPT

## 2022-08-24 PROCEDURE — 72197 MRI PELVIS W/O & W/DYE: CPT | Mod: MG

## 2022-08-24 PROCEDURE — 87637 SARSCOV2&INF A&B&RSV AMP PRB: CPT

## 2022-08-24 PROCEDURE — 85025 COMPLETE CBC W/AUTO DIFF WBC: CPT

## 2022-08-24 PROCEDURE — G1004: CPT

## 2022-08-24 PROCEDURE — 76377 3D RENDER W/INTRP POSTPROCES: CPT

## 2022-08-24 PROCEDURE — 73552 X-RAY EXAM OF FEMUR 2/>: CPT

## 2022-08-24 PROCEDURE — 97165 OT EVAL LOW COMPLEX 30 MIN: CPT

## 2022-08-24 PROCEDURE — 72190 X-RAY EXAM OF PELVIS: CPT

## 2022-08-24 PROCEDURE — A9585: CPT

## 2022-08-24 PROCEDURE — 99285 EMERGENCY DEPT VISIT HI MDM: CPT | Mod: 25

## 2022-08-24 PROCEDURE — 86803 HEPATITIS C AB TEST: CPT

## 2022-08-24 RX ORDER — IBUPROFEN 200 MG
1 TABLET ORAL
Qty: 0 | Refills: 0 | DISCHARGE

## 2022-08-24 RX ORDER — PAZOPANIB HYDROCHLORIDE 200 MG/1
1 TABLET, FILM COATED ORAL
Qty: 0 | Refills: 0 | DISCHARGE

## 2022-08-24 RX ORDER — CHOLECALCIFEROL (VITAMIN D3) 125 MCG
2000 CAPSULE ORAL
Qty: 0 | Refills: 0 | DISCHARGE
Start: 2022-08-24

## 2022-08-24 RX ORDER — ACETAMINOPHEN 500 MG
2 TABLET ORAL
Qty: 0 | Refills: 0 | DISCHARGE
Start: 2022-08-24

## 2022-08-24 RX ORDER — CHOLECALCIFEROL (VITAMIN D3) 125 MCG
2 CAPSULE ORAL
Qty: 0 | Refills: 0 | DISCHARGE

## 2022-08-24 RX ADMIN — Medication 2000 UNIT(S): at 11:17

## 2022-08-24 RX ADMIN — ENOXAPARIN SODIUM 40 MILLIGRAM(S): 100 INJECTION SUBCUTANEOUS at 05:33

## 2022-08-24 NOTE — PROGRESS NOTE ADULT - NS ATTEND AMEND GEN_ALL_CORE FT
agree with above. pt walking with walker. dc today to follow up with [facility].

## 2022-08-24 NOTE — PROGRESS NOTE ADULT - SUBJECTIVE AND OBJECTIVE BOX
DATE OF SERVICE: 08-24-22 @ 12:58    Patient is a 77y old  Male who presents with a chief complaint of RIGHT hip pain since yesterday (24 Aug 2022 10:35)      SUBJECTIVE / OVERNIGHT EVENTS:  No chest pain. No shortness of breath. No complaints. No events overnight. pain is controlled.  can walk with a walker    MEDICATIONS  (STANDING):  cholecalciferol 2000 Unit(s) Oral daily  enoxaparin Injectable 40 milliGRAM(s) SubCutaneous every 24 hours    MEDICATIONS  (PRN):  acetaminophen     Tablet .. 650 milliGRAM(s) Oral every 6 hours PRN Temp greater or equal to 38C (100.4F), Mild Pain (1 - 3)      Vital Signs Last 24 Hrs  T(C): 37.3 (24 Aug 2022 12:09), Max: 37.8 (24 Aug 2022 00:03)  T(F): 99.2 (24 Aug 2022 12:09), Max: 100 (24 Aug 2022 00:03)  HR: 93 (24 Aug 2022 12:09) (83 - 93)  BP: 106/72 (24 Aug 2022 12:09) (106/72 - 141/76)  BP(mean): --  RR: 18 (24 Aug 2022 12:09) (18 - 18)  SpO2: 96% (24 Aug 2022 12:09) (92% - 97%)    Parameters below as of 24 Aug 2022 12:09  Patient On (Oxygen Delivery Method): room air      CAPILLARY BLOOD GLUCOSE        I&O's Summary    23 Aug 2022 07:01  -  24 Aug 2022 07:00  --------------------------------------------------------  IN: 733 mL / OUT: 340 mL / NET: 393 mL        PHYSICAL EXAM:  GENERAL: NAD, well-developed  HEAD:  Atraumatic, Normocephalic  EYES: EOMI, PERRLA, conjunctiva and sclera clear  NECK: Supple, No JVD  CHEST/LUNG: Clear to auscultation bilaterally; No wheeze  HEART: Regular rate and rhythm; No murmurs, rubs, or gallops  ABDOMEN: Soft, Nontender, Nondistended; Bowel sounds present  EXTREMITIES:  2+ Peripheral Pulses, No clubbing, cyanosis, or edema  PSYCH: AAOx3  NEUROLOGY: non-focal  SKIN: No rashes or lesions    LABS:                    RADIOLOGY & ADDITIONAL TESTS:    Imaging Personally Reviewed:    Consultant(s) Notes Reviewed:      Care Discussed with Consultants/Other Providers:  
Patient is a 77y old  Male who presents with a chief complaint of RIGHT hip pain since yesterday (23 Aug 2022 12:35)    Patient seen this morning. Pain is under better control. Working with PT and OT.    MEDICATIONS  (STANDING):  cholecalciferol 2000 Unit(s) Oral daily  enoxaparin Injectable 40 milliGRAM(s) SubCutaneous every 24 hours    MEDICATIONS  (PRN):  acetaminophen     Tablet .. 650 milliGRAM(s) Oral every 6 hours PRN Temp greater or equal to 38C (100.4F), Mild Pain (1 - 3)      Vital Signs Last 24 Hrs  T(C): 37.1 (23 Aug 2022 11:57), Max: 37.3 (23 Aug 2022 00:01)  T(F): 98.8 (23 Aug 2022 11:57), Max: 99.2 (23 Aug 2022 00:01)  HR: 83 (23 Aug 2022 11:57) (70 - 88)  BP: 124/75 (23 Aug 2022 11:57) (104/65 - 127/72)  BP(mean): --  RR: 18 (23 Aug 2022 11:57) (18 - 18)  SpO2: 97% (23 Aug 2022 11:57) (94% - 97%)    Parameters below as of 23 Aug 2022 11:57  Patient On (Oxygen Delivery Method): room air        PE  NAD  Awake, alert  Anicteric, MMM  No c/c/e  No rash grossly                            13.8   4.32  )-----------( 164      ( 22 Aug 2022 09:21 )             42.1       08-22    141  |  107  |  13  ----------------------------<  77  4.1   |  22  |  1.07    Ca    8.6      22 Aug 2022 09:21      ACC: [id number] EXAM:  MR PELVIS BONY ONLY WAW IC                          PROCEDURE DATE:  08/22/2022          INTERPRETATION:  MR PELVIS BONY WITHOUT AND WITH IV CONTRAST    HISTORY: Right hip pain. Metastatic disease with pathologic fracture.   Renal cell carcinoma.    TECHNIQUE:  Multiplanar MRI of the pelvis was performed without and with   10 cc administered Gadavist intravenous gadolinium contrast using 7   sequences.    COMPARISON:  Pelvis x-rays and CT dated 8/21/2022    FINDINGS:    OSSEOUS STRUCTURES    Fractures/Stress Reactions:  None.    Mass(es)/Cyst(s):  Lobulated expansile lesion is again seen within the   right iliac crest measuring approximately 5.7 x 4.4 cm axially with   peripheral enhancement and osseous expansion/cortical breakthrough. There   is adjacent marrow edema with hyperemia extending towards the acetabulum.   Surrounding soft tissue edema and enhancement is also present involving   the iliacus and gluteus muscles. 1.5 x 1.0 cm enhancing lesion is present   within the left iliac crest.    PELVIC JOINTS    Symphysis Pubis:  Preserved.    Sacroiliac Joints:  Preserved.    RIGHT HIP JOINT    Morphology:  Normal.    Avascular Necrosis:  None.    Articular Cartilage:  Grossly preserved given the large field of view.    Effusion/Synovitis:  None.    RIGHT HIP TENDONS    Gluteus Minimus Tendon:  Mild tendinopathy is present with slight   intrasubstance tearing.    Gluteus Medius Tendon:  Intact.    Iliopsoas Tendon:  Intact.    Common Hamstring Tendon:  Mild tendinopathy is present with slight   intrasubstance tearing.    Bursa:  None.    LEFT HIP JOINT    Morphology:  Normal.    Avascular Necrosis:  None.    Articular Cartilage:  Grossly preserved given the large field of view.    Effusion/Synovitis:  None.    LEFT HIP TENDONS    Gluteus Minimus Tendon:  Intact.    Gluteus Medius Tendon:  Intact. Several millimeter calcification is   noted along the greater trochanter.    Iliopsoas Tendon:  Intact.    Common Hamstring Tendon:  Mild tendinopathy is present with slight   intrasubstance tearing.    Bursa:  None.    VISUALIZED SPINE  Mild to moderate lower lumbar facet arthrosis is noted.    SOFT TISSUES    Pelvis/Abdomen:  Prostate appears enlarged.    Musculature:  There is moderate edema within the right iliacus and   gluteus minimus muscles along the iliac crest lesion with mild edema in   the gluteus medius.    Subcutaneous Tissues:  Mild subcutaneous edema overlies the right iliac   lesion.    NEUROVASCULAR STRUCTURES    Sacral Neuroforamina:  Widely patent.    Neural Plexuses:  Maintained.    IMPRESSION:  1. Expansile lesion of the right iliac crest is again seen measuring   approximately 5.7 x 4.4 cm with peripheral enhancement and surrounding   soft tissue edema with enhancement. Surrounding marrow edema could   reflect a slight pathologic fracture.  2. 1.5 x 1.0 cm enhancing lesion is present within the left iliac crest.    --- End of Report ---          
Patient is a 77y old  Male who presents with a chief complaint of RIGHT hip pain since yesterday (24 Aug 2022 12:57)    Patient seen this morning. Wife at bedside. No new complaints. Comfortable when sitting, still with some pain on ambulation    MEDICATIONS  (STANDING):  cholecalciferol 2000 Unit(s) Oral daily  enoxaparin Injectable 40 milliGRAM(s) SubCutaneous every 24 hours    MEDICATIONS  (PRN):  acetaminophen     Tablet .. 650 milliGRAM(s) Oral every 6 hours PRN Temp greater or equal to 38C (100.4F), Mild Pain (1 - 3)    Vital Signs Last 24 Hrs  T(C): 37.3 (24 Aug 2022 12:09), Max: 37.8 (24 Aug 2022 00:03)  T(F): 99.2 (24 Aug 2022 12:09), Max: 100 (24 Aug 2022 00:03)  HR: 93 (24 Aug 2022 12:09) (83 - 93)  BP: 106/72 (24 Aug 2022 12:09) (106/72 - 141/76)  BP(mean): --  RR: 18 (24 Aug 2022 12:09) (18 - 18)  SpO2: 96% (24 Aug 2022 12:09) (92% - 97%)    Parameters below as of 24 Aug 2022 12:09  Patient On (Oxygen Delivery Method): room air        PE  NAD  Awake, alert  Anicteric, MMM  No c/c/e  No rash grossly                  
DATE OF SERVICE: 08-23-22 @ 12:35    Patient is a 77y old  Male who presents with a chief complaint of RIGHT hip pain since yesterday (22 Aug 2022 12:12)      SUBJECTIVE / OVERNIGHT EVENTS:  No chest pain. No shortness of breath. No complaints. No events overnight.     MEDICATIONS  (STANDING):  cholecalciferol 2000 Unit(s) Oral daily  enoxaparin Injectable 40 milliGRAM(s) SubCutaneous every 24 hours    MEDICATIONS  (PRN):  acetaminophen     Tablet .. 650 milliGRAM(s) Oral every 6 hours PRN Temp greater or equal to 38C (100.4F), Mild Pain (1 - 3)      Vital Signs Last 24 Hrs  T(C): 37.1 (23 Aug 2022 11:57), Max: 37.3 (23 Aug 2022 00:01)  T(F): 98.8 (23 Aug 2022 11:57), Max: 99.2 (23 Aug 2022 00:01)  HR: 83 (23 Aug 2022 11:57) (70 - 88)  BP: 124/75 (23 Aug 2022 11:57) (104/65 - 127/72)  BP(mean): --  RR: 18 (23 Aug 2022 11:57) (18 - 18)  SpO2: 97% (23 Aug 2022 11:57) (94% - 97%)    Parameters below as of 23 Aug 2022 11:57  Patient On (Oxygen Delivery Method): room air      CAPILLARY BLOOD GLUCOSE        I&O's Summary    22 Aug 2022 07:01  -  23 Aug 2022 07:00  --------------------------------------------------------  IN: 121 mL / OUT: 0 mL / NET: 121 mL    23 Aug 2022 07:01  -  23 Aug 2022 12:35  --------------------------------------------------------  IN: 118 mL / OUT: 0 mL / NET: 118 mL        PHYSICAL EXAM:  GENERAL: NAD, well-developed  HEAD:  Atraumatic, Normocephalic  EYES: EOMI, PERRLA, conjunctiva and sclera clear  NECK: Supple, No JVD  CHEST/LUNG: Clear to auscultation bilaterally; No wheeze  HEART: Regular rate and rhythm; No murmurs, rubs, or gallops  ABDOMEN: Soft, Nontender, Nondistended; Bowel sounds present  EXTREMITIES:  2+ Peripheral Pulses, No clubbing, cyanosis, or edema  PSYCH: AAOx3  NEUROLOGY: non-focal  SKIN: No rashes or lesions    LABS:                        13.8   4.32  )-----------( 164      ( 22 Aug 2022 09:21 )             42.1     08-22    141  |  107  |  13  ----------------------------<  77  4.1   |  22  |  1.07    Ca    8.6      22 Aug 2022 09:21      < from: MR Pelvis Bony Only w/wo IV Cont (08.22.22 @ 15:47) >  FINDINGS:    OSSEOUS STRUCTURES    Fractures/Stress Reactions:  None.    Mass(es)/Cyst(s):  Lobulated expansile lesion is again seen within the   right iliac crest measuring approximately 5.7 x 4.4 cm axially with   peripheral enhancement and osseous expansion/cortical breakthrough. There   is adjacent marrow edema with hyperemia extending towards the acetabulum.   Surrounding soft tissue edema and enhancement is also present involving   the iliacus and gluteus muscles. 1.5 x 1.0 cm enhancing lesion is present   within the left iliac crest.    PELVIC JOINTS    Symphysis Pubis:  Preserved.    Sacroiliac Joints:  Preserved.    RIGHT HIP JOINT    Morphology:  Normal.    Avascular Necrosis:  None.    Articular Cartilage:  Grossly preserved given the large field of view.    Effusion/Synovitis:  None.    RIGHT HIP TENDONS    Gluteus Minimus Tendon:  Mild tendinopathy is present with slight   intrasubstance tearing.    Gluteus Medius Tendon:  Intact.    Iliopsoas Tendon:  Intact.    Common Hamstring Tendon:  Mild tendinopathy is present with slight   intrasubstance tearing.    Bursa:  None.    LEFT HIP JOINT    Morphology:  Normal.    Avascular Necrosis:  None.    Articular Cartilage:  Grossly preserved given the large field of view.    Effusion/Synovitis:  None.    LEFT HIP TENDONS    Gluteus Minimus Tendon:  Intact.    Gluteus Medius Tendon:  Intact. Several millimeter calcification is   noted along the greater trochanter.    Iliopsoas Tendon:  Intact.    Common Hamstring Tendon:  Mild tendinopathy is present with slight   intrasubstance tearing.    Bursa:  None.    VISUALIZED SPINE  Mild to moderate lower lumbar facet arthrosis is noted.    SOFT TISSUES    Pelvis/Abdomen:  Prostate appears enlarged.    Musculature:  There is moderate edema within the right iliacus and   gluteus minimus muscles along the iliac crest lesion with mild edema in   the gluteus medius.    Subcutaneous Tissues:  Mild subcutaneous edema overlies the right iliac   lesion.    NEUROVASCULAR STRUCTURES    Sacral Neuroforamina:  Widely patent.    Neural Plexuses:  Maintained.    IMPRESSION:  1. Expansile lesion of the right iliac crest is again seen measuring   approximately 5.7 x 4.4 cm with peripheral enhancement and surrounding   soft tissue edema with enhancement. Surrounding marrow edema could   reflect a slight pathologic fracture.  2. 1.5 x 1.0 cm enhancing lesion is present within the left iliac crest.    --- End of Report ---    < end of copied text >            RADIOLOGY & ADDITIONAL TESTS:    Imaging Personally Reviewed:    Consultant(s) Notes Reviewed:      Care Discussed with Consultants/Other Providers:  
08-22-22 @ 12:12  77 year old male with pmhx RCC s/p L nephrectomy 2005, with metastatic Lung lesion dx 2016 and R pelvic lesion dx May 2022 s/p radiation treatment presents to ED c/o R hip weakness and discomfort with walking. Pt reports that he was away visiting his son in [place] and noticed some discomfort in his R hip while walking over the past few days. Last night was out to dinner and after eating was unable to get up by himself or walk secondary to discomfort and weakness in the R hip. Since has been unable to bear weight on the R leg. Reports crawling to the bathroom last night. Denies falls/trauma, back pain, numbness, tingling, issues with urination or BMs.    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  Convalescence at home, no treatment.      H/O left nephrectomy  For RCC/ hypernephroma in 2005      H/O melanoma excision  Face presumed in [id number].      S/P cataract surgery  B/L.          Review of Systems:   CONSTITUTIONAL: No fever, weight loss, or fatigue  EYES: No eye pain, visual disturbances, or discharge  ENMT:  No difficulty hearing, tinnitus, vertigo; No sinus or throat pain  NECK: No pain or stiffness  BREASTS: No pain, masses, or nipple discharge  RESPIRATORY: No cough, wheezing, chills or hemoptysis; No shortness of breath  CARDIOVASCULAR: No chest pain, palpitations, dizziness, or leg swelling  GASTROINTESTINAL: No abdominal or epigastric pain. No nausea, vomiting, or hematemesis; No diarrhea or constipation. No melena or hematochezia.  GENITOURINARY: No dysuria, frequency, hematuria, or incontinence  NEUROLOGICAL: No headaches, memory loss, loss of strength, numbness, or tremors  SKIN: No itching, burning, rashes, or lesions   LYMPH NODES: No enlarged glands  ENDOCRINE: No heat or cold intolerance; No hair loss  MUSCULOSKELETAL: No joint pain or swelling; No muscle, back, or extremity pain  PSYCHIATRIC: No depression, anxiety, mood swings, or difficulty sleeping  HEME/LYMPH: No easy bruising, or bleeding gums  ALLERY AND IMMUNOLOGIC: No hives or eczema    Allergies    No Known Allergies    Intolerances        Social History:     FAMILY HISTORY:  Family history of colon cancer in mother (Mother)  Lung CA at 82.   Passed away at age 88 with brain tumour.        MEDICATIONS  (STANDING):  cholecalciferol 2000 Unit(s) Oral daily  enoxaparin Injectable 40 milliGRAM(s) SubCutaneous every 24 hours    MEDICATIONS  (PRN):  acetaminophen     Tablet .. 650 milliGRAM(s) Oral every 6 hours PRN Temp greater or equal to 38C (100.4F), Mild Pain (1 - 3)      Vital Signs Last 24 Hrs  T(C): 37.2 (22 Aug 2022 11:38), Max: 37.2 (21 Aug 2022 19:30)  T(F): 99 (22 Aug 2022 11:38), Max: 99 (21 Aug 2022 19:30)  HR: 78 (22 Aug 2022 11:38) (66 - 78)  BP: 130/83 (22 Aug 2022 11:38) (112/71 - 138/91)  BP(mean): --  RR: 18 (22 Aug 2022 11:38) (18 - 20)  SpO2: 98% (22 Aug 2022 11:38) (96% - 98%)    Parameters below as of 22 Aug 2022 11:38  Patient On (Oxygen Delivery Method): room air      CAPILLARY BLOOD GLUCOSE        I&O's Summary    22 Aug 2022 07:01  -  22 Aug 2022 12:12  --------------------------------------------------------  IN: 1 mL / OUT: 0 mL / NET: 1 mL        PHYSICAL EXAM:  GENERAL: NAD, well-developed  HEAD:  Atraumatic, Normocephalic  EYES: EOMI, PERRLA, conjunctiva and sclera clear  NECK: Supple, No JVD  CHEST/LUNG: Clear to auscultation bilaterally; No wheeze  HEART: Regular rate and rhythm; No murmurs, rubs, or gallops  ABDOMEN: Soft, Nontender, Nondistended; Bowel sounds present  EXTREMITIES:  2+ Peripheral Pulses, No clubbing, cyanosis, or edema  PSYCH: AAOx3  NEUROLOGY: non-focal  SKIN: No rashes or lesions    LABS:                        13.8   4.32  )-----------( 164      ( 22 Aug 2022 09:21 )             42.1     08-22    141  |  107  |  13  ----------------------------<  75  4.1   |  22  |  1.07    Ca    8.6      22 Aug 2022 09:21    TPro  7.3  /  Alb  4.4  /  TBili  0.6  /  DBili  x   /  AST  26  /  ALT  15  /  AlkPhos  61  08-21              RADIOLOGY & ADDITIONAL TESTS:    Imaging Personally Reviewed:    Consultant(s) Notes Reviewed:      Care Discussed with Consultants/Other Providers:

## 2022-08-24 NOTE — PROGRESS NOTE ADULT - REASON FOR ADMISSION
RIGHT hip pain since yesterday

## 2022-08-24 NOTE — DISCHARGE NOTE PROVIDER - HOSPITAL COURSE
78 y/o M--followed by his physicians at [facility] as above--patient with S/P LEFT open radical nephrectomy in 2005 following an episode of gross haematuria with hypernephroma, presumed to be in remission, but with close followup at [facility] with progressive subcarinal and RIGHT hilar VIVI in July 2014, thoracic VIVI in 205, with S/P endobronchial US RIGHT hilar core biopsy with recurrence of of hypernephroma, with chemo, with May 2022 with large lytic RIGHT ilium lesion and small change LEFT ilium with RT to the RIGHT pelvis in June 2022.   Patient with episode of COVID-19 in 12/2020 (has had COVID-19 vaccine x 3 since) with patient with convalescence at home with no treatment at the time.  Patient with presumed mechanical fall in Jan 2022 with reported imaging studies negative for fracture and followed by his orthopaedists at [facility] at the time.  Patient was at son's residence in [place] when patient could not weight bear on the RIGHT hip--no fall, no trauma.  Son helped patient to the couch but patient refused hospitalization in [place] and was brought to [place], [place].   Patient presently denies RIGHT hip/left hip pain.  NO leg pain.   NO back pain, no tearing back pain.  No abdominal pain.  NO fever, no chills, no rigors.  NO diaphoresis.  NO anorexia or weight loss.  (21 Aug 2022 19:06)    Hematology/Oncology called to see patient who is under care by [facility] Dr. Mark Trinidad for the treatment of metastatic renal cell carcinoma (biopsy proven mets to the LN's, bone) S/P RT to R hip 5/2022 currently on palbociclib 200 mg PO daily (dose modified secondary to skin and hepatic toxicities).     Metastatic Renal Cell Carcinoma  --Under care by Dr. Mark Trinidad of [facility]  --Disease limited to bone and lymph nodes  --Would hold palbociclib during admission and resume after patient sees Dr. Trinidad after discharge  --Pending PET/CT on 8/25.    Pathologic fracture right iliac crest  --Non-weight bearing involvement per CT  --S/P MRI pelvis  --Orthopedic Oncology recommends outpatient followup if pain is well controlled and patient can be safely discharged  --Patient already had RT to right hip/pelvis - not sure if patient can get boost    Metastatic focus left femur  --Small endosteal scalloping in the mid anterior femoral cortex.  --Does not appear to be impending fracture  --Patient has been seen by Orthopedic Oncology

## 2022-08-24 NOTE — DISCHARGE NOTE NURSING/CASE MANAGEMENT/SOCIAL WORK - NSDCFUADDAPPT_GEN_ALL_CORE_FT
APPTS ARE READY TO BE MADE: [x ] YES    Best Family or Patient Contact (if needed):    Additional Information about above appointments (if needed):    1: ortho f/u  2:   3:     Other comments or requests:

## 2022-08-24 NOTE — DISCHARGE NOTE NURSING/CASE MANAGEMENT/SOCIAL WORK - FLU SEASON?
Chief Complaint   Patient presents with    Abnormal Lab Results     Patient returning call to office to discuss lab results. Notified of need for urine culture per [facility]. He will go to [facility] for this - [facility] Location. Scheduled for 2/9/22 @ 9 AM with  online. Order faxed w/ confirmation received. Orders Placed This Encounter    CULTURE, URINE     Patient made appointment online w/ [facility] location for 2/9/22 @ 9 AM.  Justa Bettsni [phone]     Standing Status:   Future     Number of Occurrences:   1     Standing Expiration Date:   2/3/2023     The above orders were approved via [facility] per Dr. Almaz Evans, III.
No

## 2022-08-24 NOTE — CHART NOTE - NSCHARTNOTEFT_GEN_A_CORE
Brief onc note     Pt is a 76 yo male with RCC on Pazopinib presenting with hippain. Follows up with [facility] for oncology care     ER to obtain imaging    If stable to gen medical floor admit to Dr Brian Bishop     Full consult to follow     Santiago Carmona MD  HematologyOncology   O: [phone]
MRI pelvis reviewed with ortho onc attending, Dr. Aguilar Cohen. No ortho surgery during this admission, can f/u outpt w/ Dr. Cohen in 1 week. Please call office to set up appointment.
Patient requested call back on 8/25/22 between 9a and 10a.
Due to patients deconditioning and generalized weakness secondary to pathological pelvic fracture and metastatic renal carcinoma. Patient will require a standard wheelchair. This is necessary to achieve daily tasks and therapies which cannot be achieved with the use of cane or rolling walker. Patient and family are in agreement with wheelchair use at home and assistance will be provided if needed.

## 2022-08-24 NOTE — DISCHARGE NOTE PROVIDER - NSDCFUADDAPPT_GEN_ALL_CORE_FT
APPTS ARE READY TO BE MADE: [x ] YES    Best Family or Patient Contact (if needed):    Additional Information about above appointments (if needed):    1: ortho f/u  2:   3:     Other comments or requests:    APPTS ARE READY TO BE MADE: [x ] YES    Best Family or Patient Contact (if needed):    Additional Information about above appointments (if needed):    1: ortho f/u  2:   3:     Other comments or requests:   Patient was previously scheduled with Aguilar Palacios on (8/30) at 10:30 AM.

## 2022-08-24 NOTE — DISCHARGE NOTE PROVIDER - NSDCMRMEDTOKEN_GEN_ALL_CORE_FT
acetaminophen 325 mg oral tablet: 2 tab(s) orally every 6 hours, As needed, Temp greater or equal to 38C (100.4F), Mild Pain (1 - 3)  cholecalciferol oral tablet: 2000 unit(s) orally once a day   acetaminophen 325 mg oral tablet: 2 tab(s) orally every 6 hours, As needed, Temp greater or equal to 38C (100.4F), Mild Pain (1 - 3)  cholecalciferol oral tablet: 2000 unit(s) orally once a day  Standard wheelchair : JAYLA 99  C 64.9  M 84.45S  Ht 185 cm  Wt 98 kg

## 2022-08-24 NOTE — PROGRESS NOTE ADULT - ASSESSMENT
77 year old male with pmhx RCC s/p L nephrectomy 2005, with metastatic Lung lesion dx 2016 and R pelvic lesion dx May 2022 s/p radiation treatment presents to ED c/o R hip weakness and discomfort with walking. Pt reports that he was away visiting his son in [place] and noticed some discomfort in his R hip while walking over the past few days. Last night was out to dinner and after eating was unable to get up by himself or walk secondary to discomfort and weakness in the R hip. Since has been unable to bear weight on the R leg. Reports crawling to the bathroom last night. Denies falls/trauma, back pain, numbness, tingling, issues with urination or BMs.    Metastatic Renal Cell Carcinoma  --Under care by Dr. Mark Trinidad of [facility]  --Disease limited to bone and lymph nodes  --Would hold palbociclib during admission and resume after patient sees Dr. Trinidad after discharge  --Pending PET/CT next week    Pathologic fracture right iliac crest  --Non-weight bearing involvement per CT  --Pending MRI  --Management per Orthopedic Oncology  --May need RT consultation for pain control - if stable could get as outpatient  --May contact palliative for additional pain management as well    Metastatic focus left femur  --Small endosteal scalloping in the mid anterior femoral cortex.  --Pending MRI  --Patient has been seen by Orthopedic Oncology  
77 year old male with pmhx RCC s/p L nephrectomy 2005, with metastatic Lung lesion dx 2016 and R pelvic lesion dx May 2022 s/p radiation treatment presents to ED c/o R hip weakness and discomfort with walking. Pt reports that he was away visiting his son in [place] and noticed some discomfort in his R hip while walking over the past few days. Last night was out to dinner and after eating was unable to get up by himself or walk secondary to discomfort and weakness in the R hip. Since has been unable to bear weight on the R leg. Reports crawling to the bathroom last night. Denies falls/trauma, back pain, numbness, tingling, issues with urination or BMs.    Metastatic Renal Cell Carcinoma  --Under care by Dr. Mark Trinidad of [facility]  --Disease limited to bone and lymph nodes  --Would hold palbociclib during admission and resume after patient sees Dr. Trinidad after discharge  --Pending PET/CT next week    Pathologic fracture right iliac crest  --Non-weight bearing involvement per CT  --Pending MRI  --Management per Orthopedic Oncology as out pt  --May need RT consultation for pain control - if stable could get as outpatient    Metastatic focus left femur  --Small endosteal scalloping in the mid anterior femoral cortex.  -- MRI done  --Patient has been seen by Orthopedic Oncology  - no acte surgical intervention  - may follow up as out pt  
NIGHT HOSPITALIST:    Patient UNKNOWN to me previously, assigned to me at this point via the ER and by Dr. Douglas to admit this independent 78 y/o M--followed by his physicians at [facility] as above--patient with S/P LEFT open radical nephrectomy in 2005 following an episode of gross haematuria with hypernephroma, presumed to be in remission, but with close followup at [facility] with progressive subcarinal and RIGHT hilar VIVI in July 2014, thoracic VIVI in 205, with S/P endobronchial US RIGHT hilar core biopsy with recurrence of of hypernephroma, with chemo, with May 2022 with large lytic RIGHT ilium lesion and small change LEFT ilium with RT to the RIGHT pelvis in June 2022.   Patient with episode of COVID-19 in 12/2020 (has had COVID-19 vaccine x 3 since) with patient with convalescence at home with no treatment at the time.  Patient with presumed mechanical fall in Jan 2022 with reported imaging studies negative for fracture and followed by his orthopaedists at [facility] at the time.  Patient was at son's residence in [place] when patient could not weight bear on the RIGHT hip--no fall, no trauma.  Son helped patient to the couch but patient refused hospitalization in [place] and was brought to [place], [place].   Patient presently denies RIGHT hip/left hip pain.  NO leg pain.   NO back pain, no tearing back pain.  No abdominal pain.  NO fever, no chills, no rigors.  NO diaphoresis.  NO anorexia or weight loss.  (21 Aug 2022 19:06)    Hematology/Oncology called to see patient who is under care by [facility] Dr. Mark Trinidad for the treatment of metastatic renal cell carcinoma (biopsy proven mets to the LN's, bone) S/P RT to R hip 5/2022 currently on palbociclib 200 mg PO daily (dose modified secondary to skin and hepatic toxicities).     Metastatic Renal Cell Carcinoma  --Under care by Dr. Mark Trinidad of [facility]  --Disease limited to bone and lymph nodes  --Would hold palbociclib during admission and resume after patient sees Dr. Trinidad after discharge  --Pending PET/CT on 8/25.    Pathologic fracture right iliac crest  --Non-weight bearing involvement per CT  --S/P MRI pelvis  --Orthopedic Oncology recommends outpatient followup if pain is well controlled and patient can be safely discharged  --Patient already had RT to right hip/pelvis - not sure if patient can get boost  --May contact palliative for additional pain management if needed    Metastatic focus left femur  --Small endosteal scalloping in the mid anterior femoral cortex.  --Does not appear to be impending fracture  --Patient has been seen by Orthopedic Oncology    Discharge planning is underway.    We will continue to follow patient and coordinate with [facility].    Casey Pope PA-C  Hematology/Oncology  [facility]   [phone] (office)  [phone] (alt office)  Evenings and weekends please call MD on call or office  
NIGHT HOSPITALIST:    Patient UNKNOWN to me previously, assigned to me at this point via the ER and by Dr. Douglas to admit this independent 78 y/o M--followed by his physicians at [facility] as above--patient with S/P LEFT open radical nephrectomy in 2005 following an episode of gross haematuria with hypernephroma, presumed to be in remission, but with close followup at [facility] with progressive subcarinal and RIGHT hilar VIVI in July 2014, thoracic VIVI in 205, with S/P endobronchial US RIGHT hilar core biopsy with recurrence of of hypernephroma, with chemo, with May 2022 with large lytic RIGHT ilium lesion and small change LEFT ilium with RT to the RIGHT pelvis in June 2022.   Patient with episode of COVID-19 in 12/2020 (has had COVID-19 vaccine x 3 since) with patient with convalescence at home with no treatment at the time.  Patient with presumed mechanical fall in Jan 2022 with reported imaging studies negative for fracture and followed by his orthopaedists at [facility] at the time.  Patient was at son's residence in [place] when patient could not weight bear on the RIGHT hip--no fall, no trauma.  Son helped patient to the couch but patient refused hospitalization in [place] and was brought to [place], [place].   Patient presently denies RIGHT hip/left hip pain.  NO leg pain.   NO back pain, no tearing back pain.  No abdominal pain.  NO fever, no chills, no rigors.  NO diaphoresis.  NO anorexia or weight loss.  (21 Aug 2022 19:06)    Hematology/Oncology called to see patient who is under care by [facility] Dr. Mark Trinidad for the treatment of metastatic renal cell carcinoma (biopsy proven mets to the LN's, bone) S/P RT to R hip 5/2022 currently on palbociclib 200 mg PO daily (dose modified secondary to skin and hepatic toxicities).     Metastatic Renal Cell Carcinoma  --Under care by Dr. Mark Trinidad of [facility]  --Disease limited to bone and lymph nodes  --Would hold palbociclib during admission and resume after patient sees Dr. Trinidad after discharge  --Pending PET/CT on 8/25.    Pathologic fracture right iliac crest  --Non-weight bearing involvement per CT  --S/P MRI pelvis  --Orthopedic Oncology recommends outpatient followup if pain is well controlled and patient can be safely discharged  --Patient already had RT to right hip/pelvis - not sure if patient can get boost  --May contact palliative for additional pain management as well    Metastatic focus left femur  --Small endosteal scalloping in the mid anterior femoral cortex.  --Does not appear to be impending fracture  --Patient has been seen by Orthopedic Oncology    We will continue to follow patient and coordinate with [facility].    Casey Pope PA-C  Hematology/Oncology  [facility]   [phone] (office)  [phone] (alt office)  Evenings and weekends please call MD on call or office  
77 year old male with pmhx RCC s/p L nephrectomy 2005, with metastatic Lung lesion dx 2016 and R pelvic lesion dx May 2022 s/p radiation treatment presents to ED c/o R hip weakness and discomfort with walking. Pt reports that he was away visiting his son in [place] and noticed some discomfort in his R hip while walking over the past few days. Last night was out to dinner and after eating was unable to get up by himself or walk secondary to discomfort and weakness in the R hip. Since has been unable to bear weight on the R leg. Reports crawling to the bathroom last night. Denies falls/trauma, back pain, numbness, tingling, issues with urination or BMs.    Metastatic Renal Cell Carcinoma  --[place] Mark Trinidad [place] [facility]  --Disease limited to bone and lymph nodes  --Would hold palbociclib during admission and resume after patient sees Dr. Trinidad after discharge  --Pending PET/CT next week    Pathologic fracture right iliac crest  --Non-weight bearing involvement per CT  --Pending MRI  --Management per Orthopedic Oncology  --May need RT consultation for pain control - if stable could get as outpatient  --May contact palliative for additional pain management as well    Metastatic focus left femur  --Small endosteal scalloping in the mid anterior femoral cortex.  -- MRI done  --Patient has been seen by Orthopedic Oncology

## 2022-08-24 NOTE — DISCHARGE NOTE PROVIDER - NSDCCPCAREPLAN_GEN_ALL_CORE_FT
PRINCIPAL DISCHARGE DIAGNOSIS  Diagnosis: Pathological fracture of pelvis, sequela  Assessment and Plan of Treatment: Right Iliac Crest pathologic fracture seen on CT and MRI  WBAT w/ assistance  No ortho surgery during this admission, can follow up outpt w/ Dr. Cohen in 1 week.      SECONDARY DISCHARGE DIAGNOSES  Diagnosis: Renal cell carcinoma  Assessment and Plan of Treatment: --Held palbociclib during admission and resume after you see Dr. Trinidad after discharge  --Pending PET/CT on 8/25.

## 2022-08-24 NOTE — DISCHARGE NOTE NURSING/CASE MANAGEMENT/SOCIAL WORK - NSDCPEFALRISK_GEN_ALL_CORE
For information on Fall & Injury Prevention, visit: [email or web address] OR  [email or web address] OR  https://www.cdc.gov/steadi/patient.html

## 2022-08-24 NOTE — DISCHARGE NOTE NURSING/CASE MANAGEMENT/SOCIAL WORK - PATIENT PORTAL LINK FT
You can access the FollowMyHealth Patient Portal offered by [facility] by registering at the following website: [email or web address]. By joining [employer]’s FollowMyHealth portal, you will also be able to view your health information using other applications (apps) compatible with our system.

## 2022-08-24 NOTE — DISCHARGE NOTE PROVIDER - CARE PROVIDER_API CALL
Aguilar Cohen)  [address], [facility] [address]  [place] [address]  Phone: [phone]  Fax: ()-  Follow Up Time: 1 week

## 2022-08-27 ENCOUNTER — NON-APPOINTMENT (OUTPATIENT)
Age: 77
End: 2022-08-27

## 2022-08-30 ENCOUNTER — APPOINTMENT (OUTPATIENT)
Dept: ORTHOPEDIC SURGERY | Facility: CLINIC | Age: 77
End: 2022-08-30

## 2022-08-30 ENCOUNTER — NON-APPOINTMENT (OUTPATIENT)
Age: 77
End: 2022-08-30

## 2022-08-30 VITALS — WEIGHT: 219 LBS | BODY MASS INDEX: 29.03 KG/M2 | HEIGHT: 73 IN

## 2022-08-30 DIAGNOSIS — M89.9 DISORDER OF BONE, UNSPECIFIED: ICD-10-CM

## 2022-08-30 PROCEDURE — 99214 OFFICE O/P EST MOD 30 MIN: CPT

## 2022-09-26 NOTE — OCCUPATIONAL THERAPY INITIAL EVALUATION ADULT - LEVEL OF INDEPENDENCE: SIT/SUPINE, REHAB EVAL
Problem: Pressure Injury - Risk of  Goal: *Prevention of pressure injury  Description: Document Pepe Scale and appropriate interventions in the flowsheet.   Outcome: Progressing Towards Goal  Note: Pressure Injury Interventions:  Sensory Interventions: Avoid rigorous massage over bony prominences, Check visual cues for pain, Monitor skin under medical devices, Pad between skin to skin    Moisture Interventions: Check for incontinence Q2 hours and as needed, Internal/External urinary devices, Maintain skin hydration (lotion/cream)    Activity Interventions: Increase time out of bed, Pressure redistribution bed/mattress(bed type), PT/OT evaluation    Mobility Interventions: HOB 30 degrees or less, Pressure redistribution bed/mattress (bed type), Float heels    Nutrition Interventions: Discuss nutritional consult with provider, Document food/fluid/supplement intake    Friction and Shear Interventions: Foam dressings/transparent film/skin sealants, HOB 30 degrees or less, Minimize layers                Problem: Patient Education: Go to Patient Education Activity  Goal: Patient/Family Education  Outcome: Progressing Towards Goal     Problem: Patient Education: Go to Patient Education Activity  Goal: Patient/Family Education  Outcome: Progressing Towards Goal     Problem: Patient Education: Go to Patient Education Activity  Goal: Patient/Family Education  Outcome: Progressing Towards Goal independent

## 2023-12-06 ENCOUNTER — APPOINTMENT (OUTPATIENT)
Dept: INTERNAL MEDICINE | Facility: CLINIC | Age: 78
End: 2023-12-06

## 2024-03-25 ENCOUNTER — APPOINTMENT (OUTPATIENT)
Dept: INTERNAL MEDICINE | Facility: CLINIC | Age: 79
End: 2024-03-25

## 2024-10-14 ENCOUNTER — APPOINTMENT (OUTPATIENT)
Facility: CLINIC | Age: 79
End: 2024-10-14
Payer: MEDICARE

## 2024-10-14 VITALS
HEART RATE: 74 BPM | BODY MASS INDEX: 29.03 KG/M2 | DIASTOLIC BLOOD PRESSURE: 72 MMHG | WEIGHT: 219 LBS | TEMPERATURE: 98 F | SYSTOLIC BLOOD PRESSURE: 124 MMHG | RESPIRATION RATE: 17 BRPM | HEIGHT: 73 IN | OXYGEN SATURATION: 99 %

## 2024-10-14 PROCEDURE — 14040 TIS TRNFR F/C/C/M/N/A/G/H/F: CPT

## 2024-10-14 PROCEDURE — 14020 TIS TRNFR S/A/L 10 SQ CM/<: CPT

## 2024-10-21 ENCOUNTER — APPOINTMENT (OUTPATIENT)
Facility: CLINIC | Age: 79
End: 2024-10-21
Payer: MEDICARE

## 2024-10-21 PROCEDURE — 99024 POSTOP FOLLOW-UP VISIT: CPT

## 2025-07-17 NOTE — PATIENT PROFILE ADULT - DEAF OR HARD OF HEARING?
Physical Therapy Progress Note    Visit Type: Progress Note- Daily Treatment Note  Visit: 19  Referring Provider: Thomas Beck MD  Medical Diagnosis (from order): M25.661 - Stiffness of right knee     SUBJECTIVE                                                                                                               Has more clunking in the knee in the last 3 days when she walks and does weightbearing. Didn't complete any exercises this morning.   Functional Change: Able to get in and out of the car, get around at home better, able to walk with even stride, walking even ground, can complete uneven grass with caution   Current Functional Limitations: Tightness and pain that limits mobility, steps going up has to go slow but can complete every other step, going down is harder than going up, but might be fearful. Doesn't feel like the leg is strong enough.     Pain / Symptoms  - Pain rating (out of 10): Current: 1       OBJECTIVE                                                                                                                     Range of Motion (ROM)   (degrees unless noted; active unless noted; norms in ( ); negative=lacking to 0, positive=beyond 0)  Knee:   - Flexion (150):       Right:  112 (117 degrees)    - Extension (0-10):       Right:  0     Strength  (out of 5 unless noted, standard test position unless noted)   Hip:    - Flexion:         Right: 4+  Knee:    - Flexion:         Right: 4+    - Extension:         Right: 5  Ankle:    - Dorsiflexion:         Right: 5    - Plantar Flexion:         Right: 5                       Outcome/Assessments  Outcome Measures:   KOOSJr. Raw Score: 8  KOOS Jr Calculated Score: 65.99  (interval score: 0=total knee disability to 100=perfect knee health) see flowsheet for additional documentation        Treatment     Therapeutic Exercise  Recumbent bike-5 minutes, workload 4    Supine:  Heel slides AROM  x10 5-8 sec holds     Seated:  Sit to stand with  no shifting weight over more towards the right x10    Reassessment.         Manual Therapy   Hooklie  Bucket handle flexion mobs-in neutral, and IR, and ER   PA tibiofemoral mobilization grade 3-4 neutral and with tibial Internal rotation bias  Passive end range flexion, over pressure with heel slides  Patella mobilizations with inferior glide grade III   Soft tissue on medial hamstring       -improvement pain on the medial side of knee with bending after completion    Skilled input: verbal instruction/cues and as detailed above    Writer verbally educated and received verbal consent for hand placement, positioning of patient, and techniques to be performed today from patient for therapist position for techniques and hand placement and palpation for techniques as described above and how they are pertinent to the patient's plan of care.  Home Exercise Program  Access Code: [email or web address]  URL: [email or web address]  Date: 07/15/2025  Prepared by: Randall Patrick    Exercises  - Standing Knee Flexion Stretch on Step  - 3 x daily - 7 x weekly - 2 sets - 3 reps - 30 secs  hold  - Hooklying Heel Slide  - 5 x daily - 7 x weekly - 2 sets - 10 reps  - Quadricep Stretch with Chair and Counter Support  - 3 x daily - 7 x weekly - 2 sets - 2 reps - 20-30 secs hold  - Forward Step Up with Unilateral Counter Support  - 2 x daily - 7 x weekly - 1 sets - 10 reps  - Wall Quarter Squat  - 2 x daily - 7 x weekly - 1 sets - 8 reps - 5-10 secs hold  - Mini Lunge  - 1-2 x daily - 7 x weekly - 1 sets - 10 reps  - Tandem Stance  - 1 x daily - 7 x weekly - 2 sets - 2 reps - 20-30 secs hold      ASSESSMENT                                                                                                            Pt has made progress with range of motion into knee flexion without completing exercise she is at 112 degrees. After manual techniques, she has progressed her knee flexion to 117 degrees. She was having medial knee pain but  improved after completion of soft tissue on hamstring. Informed patient to complete exercises at home as we didn't have time to go through exercises this session due to working more on manual and completing reassessment. Informed patient that we will trial 1 time per week, but if her range decreased that we would bump her back up to two times per week. She still has difficulty with steps james without rail and when going down the step. She also has difficulty with balance with single leg and uneven surface and has some weakness in the leg functionally when standing compared to other side. Pt would benefit from skilled therapy services to address the functional limitations listed above.     Gains in skilled therapy to date as expected in function, range of motion, and strength.  Patient attends therapy as recommended.  Patient reports performing HEP as prescribed.  Progress toward discharge/long term goals (see below for specific status updates): good progress  Medically necessary skilled therapy interventions continue to be required to allow the patient to maximize their functional abilities as noted above.  Pain/symptoms after session (out of 10): 0  Education:   - Results of above outlined education: Verbalizes understanding    PLAN                                                                                                                          Continue interventions, frequency and duration established at initial evaluation.  Frequency / Duration  2 times per week tapering as patient progresses for 8 weeks for an estimated total of 6 visits    Suggestions for next session as indicated: Progress per plan of care, continue with joint mobilizations and range of motion for knee flexion, hamstring soft tissue mobilizations as needed, step progression-using less upper extremity, balance     Goals  Long Term Goals: to be met by end of plan of care  1. Pt will be able to ambulate 30 minutes with pain no greater than  2/10 to be able to complete community distances with least restrictive device.  Status: met   2. Patient will ascend and descend 4 steps using reciprocal pattern, independently with rail(s) to complete home management and self care. Status: partially met Progressing, can do, but still difficult and slow. Sometimes goes every other with step downs but sometimes has to go 1 at a time  3. Patient will report tolerating standing for 20 minutes with patient reported manageable pain/symptoms of 2/10 for completion of household tasks such as making a meal. Status: met   4. Patient will be independent with progressed and modified home exercise program.  Status: partially met  Progressing   5. KOOS jr: Patient will score 90% or higher on the KOOS Jr. to indicate improvement with walking and moving around related to knee arthroplasty. (minimal clinically important difference: 14% of calculated score) Status: not met          Therapy procedure time and total treatment time can be found documented on the Time Entry flowsheet